# Patient Record
Sex: FEMALE | Race: WHITE | Employment: OTHER | URBAN - METROPOLITAN AREA
[De-identification: names, ages, dates, MRNs, and addresses within clinical notes are randomized per-mention and may not be internally consistent; named-entity substitution may affect disease eponyms.]

---

## 2017-04-14 ENCOUNTER — HOSPITAL ENCOUNTER (OUTPATIENT)
Age: 47
Discharge: HOME | End: 2017-04-14
Attending: FAMILY MEDICINE
Payer: COMMERCIAL

## 2017-04-14 VITALS
OXYGEN SATURATION: 100 % | HEART RATE: 71 BPM | SYSTOLIC BLOOD PRESSURE: 108 MMHG | RESPIRATION RATE: 18 BRPM | TEMPERATURE: 98 F | DIASTOLIC BLOOD PRESSURE: 68 MMHG

## 2017-04-14 DIAGNOSIS — J45.901 ASTHMA EXACERBATION: Primary | ICD-10-CM

## 2017-04-14 PROCEDURE — 99214 OFFICE O/P EST MOD 30 MIN: CPT

## 2017-04-14 PROCEDURE — 94640 AIRWAY INHALATION TREATMENT: CPT

## 2017-04-14 RX ORDER — BENZONATATE 200 MG/1
200 CAPSULE ORAL 3 TIMES DAILY PRN
Qty: 30 CAPSULE | Refills: 0 | Status: SHIPPED | OUTPATIENT
Start: 2017-04-14 | End: 2017-04-24

## 2017-04-14 RX ORDER — ALBUTEROL SULFATE 90 UG/1
AEROSOL, METERED RESPIRATORY (INHALATION) EVERY 6 HOURS PRN
COMMUNITY
End: 2018-03-06

## 2017-04-14 RX ORDER — PREDNISONE 20 MG/1
TABLET ORAL
Qty: 8 TABLET | Refills: 0 | Status: SHIPPED | OUTPATIENT
Start: 2017-04-14 | End: 2022-01-14

## 2017-04-14 RX ORDER — IPRATROPIUM BROMIDE AND ALBUTEROL SULFATE 2.5; .5 MG/3ML; MG/3ML
3 SOLUTION RESPIRATORY (INHALATION) ONCE
Status: COMPLETED | OUTPATIENT
Start: 2017-04-14 | End: 2017-04-14

## 2017-04-14 RX ORDER — ALBUTEROL SULFATE 2.5 MG/3ML
2.5 SOLUTION RESPIRATORY (INHALATION) EVERY 4 HOURS PRN
Qty: 1 BOX | Refills: 0 | Status: SHIPPED | OUTPATIENT
Start: 2017-04-14 | End: 2018-03-06

## 2017-04-14 RX ORDER — PREDNISONE 20 MG/1
40 TABLET ORAL ONCE
Status: COMPLETED | OUTPATIENT
Start: 2017-04-14 | End: 2017-04-14

## 2017-04-14 NOTE — ED INITIAL ASSESSMENT (HPI)
Complains of chest congestion for the last three days. Hx Asthma and has been using inhaler more. Sneezing a lot.

## 2017-04-14 NOTE — ED PROVIDER NOTES
Patient Seen in: [facility]    History   Patient presents with:  Chest Congestion    Stated Complaint: bronchitis x 3 days    HPI    This 52-year-old female presents to the office with 3 day history of worsening cough, shortness oz/week       0 Standard drinks or equivalent per week      Review of Systems    Positive for stated complaint: bronchitis x 3 days  Other systems are as noted in HPI. Constitutional and vital signs reviewed.       All other systems reviewed and negative e prescriptions for 4 additional days of prednisone 40 mg once daily, albuterol nebulizer solution, Tessalon Perles. Usage and side effects of these medications are discussed.   She is instructed to go to the emergency room if she has increased difficulty br on the prednisone, you may only take Tylenol for fever for pain. Do not take any ibuprofen, Advil, Motrin, Aleve or aspirin. You may take the Tessalon Perles 200 mg 1 Perle every 6 hours as needed for cough. Push fluids. Humidified air.   Go to the [facility]

## 2017-04-21 ENCOUNTER — HOSPITAL ENCOUNTER (OUTPATIENT)
Age: 47
Discharge: HOME | End: 2017-04-21
Attending: FAMILY MEDICINE
Payer: COMMERCIAL

## 2017-04-21 ENCOUNTER — APPOINTMENT (OUTPATIENT)
Dept: GENERAL RADIOLOGY | Age: 47
End: 2017-04-21
Attending: FAMILY MEDICINE
Payer: COMMERCIAL

## 2017-04-21 VITALS
DIASTOLIC BLOOD PRESSURE: 73 MMHG | HEART RATE: 71 BPM | OXYGEN SATURATION: 98 % | WEIGHT: 125 LBS | TEMPERATURE: 99 F | SYSTOLIC BLOOD PRESSURE: 109 MMHG | RESPIRATION RATE: 16 BRPM | BODY MASS INDEX: 21 KG/M2

## 2017-04-21 DIAGNOSIS — J20.9 ACUTE BRONCHITIS, UNSPECIFIED ORGANISM: ICD-10-CM

## 2017-04-21 DIAGNOSIS — J45.901 ASTHMA EXACERBATION: Primary | ICD-10-CM

## 2017-04-21 PROCEDURE — 96374 THER/PROPH/DIAG INJ IV PUSH: CPT

## 2017-04-21 PROCEDURE — 94640 AIRWAY INHALATION TREATMENT: CPT

## 2017-04-21 PROCEDURE — 71020 XR CHEST PA + LAT CHEST (CPT=71020): CPT

## 2017-04-21 PROCEDURE — 99214 OFFICE O/P EST MOD 30 MIN: CPT

## 2017-04-21 RX ORDER — IPRATROPIUM BROMIDE AND ALBUTEROL SULFATE 2.5; .5 MG/3ML; MG/3ML
3 SOLUTION RESPIRATORY (INHALATION) ONCE
Status: COMPLETED | OUTPATIENT
Start: 2017-04-21 | End: 2017-04-21

## 2017-04-21 RX ORDER — METHYLPREDNISOLONE SODIUM SUCCINATE 125 MG/2ML
125 INJECTION, POWDER, LYOPHILIZED, FOR SOLUTION INTRAMUSCULAR; INTRAVENOUS ONCE
Status: COMPLETED | OUTPATIENT
Start: 2017-04-21 | End: 2017-04-21

## 2017-04-21 RX ORDER — PREDNISONE 10 MG/1
TABLET ORAL
Qty: 20 TABLET | Refills: 0 | Status: SHIPPED | OUTPATIENT
Start: 2017-04-21 | End: 2022-01-14

## 2017-04-21 RX ORDER — AZITHROMYCIN 250 MG/1
TABLET, FILM COATED ORAL
Qty: 1 PACKAGE | Refills: 0 | Status: SHIPPED | OUTPATIENT
Start: 2017-04-21 | End: 2018-03-06

## 2017-04-21 NOTE — ED INITIAL ASSESSMENT (HPI)
Patient states she was in a few days ago and thought she had bronchitis. Patient states she was told that she did not. Patient states she has gotten worse and feels as if she needs and antibiotic.

## 2017-04-22 NOTE — ED NOTES
Patient called, sts that pharmacy did not receive scripts yesterday. Prednisone and Z-Pack called in, message left on VM.

## 2018-03-06 ENCOUNTER — HOSPITAL ENCOUNTER (OUTPATIENT)
Age: 48
Discharge: HOME | End: 2018-03-06
Attending: FAMILY MEDICINE
Payer: COMMERCIAL

## 2018-03-06 VITALS
DIASTOLIC BLOOD PRESSURE: 70 MMHG | OXYGEN SATURATION: 100 % | HEART RATE: 63 BPM | TEMPERATURE: 98 F | SYSTOLIC BLOOD PRESSURE: 109 MMHG | RESPIRATION RATE: 18 BRPM

## 2018-03-06 DIAGNOSIS — J40 BRONCHITIS: Primary | ICD-10-CM

## 2018-03-06 PROCEDURE — 99213 OFFICE O/P EST LOW 20 MIN: CPT

## 2018-03-06 PROCEDURE — 99214 OFFICE O/P EST MOD 30 MIN: CPT

## 2018-03-06 RX ORDER — AZITHROMYCIN 250 MG/1
TABLET, FILM COATED ORAL
Qty: 1 PACKAGE | Refills: 0 | Status: SHIPPED | OUTPATIENT
Start: 2018-03-06 | End: 2018-03-11

## 2018-03-06 RX ORDER — ALBUTEROL SULFATE 2.5 MG/3ML
2.5 SOLUTION RESPIRATORY (INHALATION) EVERY 4 HOURS PRN
Qty: 30 AMPULE | Refills: 0 | Status: SHIPPED | OUTPATIENT
Start: 2018-03-06 | End: 2018-04-05

## 2018-03-07 NOTE — ED PROVIDER NOTES
Patient Seen in: [facility] In [facility]    History   Patient presents with:  Cough    Stated Complaint: Wheezing,tight chest 10 days    HPI    53year old female presents for cough, wheezing and chest tightness.  States started with cold sym are normal. Pupils are equal, round, and reactive to light. Neck: Normal range of motion. Neck supple. Cardiovascular: Normal rate, regular rhythm, normal heart sounds and intact distal pulses.     Pulmonary/Chest: Effort normal. She has no decreased br

## 2021-05-12 ENCOUNTER — HOSPITAL ENCOUNTER (OUTPATIENT)
Dept: GENERAL RADIOLOGY | Age: 51
Discharge: HOME | End: 2021-05-12
Attending: ORTHOPAEDIC SURGERY
Payer: COMMERCIAL

## 2021-05-12 ENCOUNTER — OFFICE VISIT (OUTPATIENT)
Dept: ORTHOPEDICS CLINIC | Facility: CLINIC | Age: 51
End: 2021-05-12
Payer: COMMERCIAL

## 2021-05-12 VITALS — WEIGHT: 120 LBS | HEIGHT: 64 IN | BODY MASS INDEX: 20.49 KG/M2

## 2021-05-12 DIAGNOSIS — M25.561 RIGHT KNEE PAIN, UNSPECIFIED CHRONICITY: ICD-10-CM

## 2021-05-12 DIAGNOSIS — S83.241A TEAR OF MEDIAL MENISCUS OF RIGHT KNEE, UNSPECIFIED TEAR TYPE, UNSPECIFIED WHETHER OLD OR CURRENT TEAR, INITIAL ENCOUNTER: Primary | ICD-10-CM

## 2021-05-12 DIAGNOSIS — M25.561 ACUTE PAIN OF RIGHT KNEE: ICD-10-CM

## 2021-05-12 PROCEDURE — 99203 OFFICE O/P NEW LOW 30 MIN: CPT | Performed by: ORTHOPAEDIC SURGERY

## 2021-05-12 PROCEDURE — 3008F BODY MASS INDEX DOCD: CPT | Performed by: ORTHOPAEDIC SURGERY

## 2021-05-12 PROCEDURE — 73564 X-RAY EXAM KNEE 4 OR MORE: CPT | Performed by: ORTHOPAEDIC SURGERY

## 2021-05-12 NOTE — PROGRESS NOTES
[facility] New Patient Note    CC: Patient presents with:  Knee Pain: Patient is here for right knee pain. Patient states that she was running about a week ago when she started to feel pain.       HPI: The patient is a 49year old female who pre control/protection: Condom       ROS:  Complete ROS reviewed by me and non-contributory to the chief complaint except as mentioned above.     Physical Exam:    Ht 5' 4\" (1.626 m)   Wt 120 lb (54.4 kg)   BMI 20.60 kg/m²   On examination she is a 72-year-old Crutch or cane would be reasonable. We will see her back once the MRI becomes available. Regis Scott MD  [address] [place] [address] Surgery      This document was partially prepared using [id number] voice recognition software.

## 2021-07-09 NOTE — ED PROVIDER NOTES
Patient Seen in: [facility] In [place]    History   Patient presents with:  Cough/URI    Stated Complaint: BRONCHITIS X2 WKS    HPI    This 69-year-old female with a known history for asthma presents the office with complaint of worsening Breath. Nebulizer Does not apply Misc,  DX. Asthma exacerbation J45.901   Budesonide-Formoterol Fumarate (SYMBICORT IN),  Inhale  into the lungs.    Albuterol Sulfate (VENTOLIN) (2.5 MG/3ML) 0.083% Inhalation Nebu Soln,  Take 2.5 mg by nebulization every inspiratory and expiratory wheezing noted. Some rhonchi bibasilar. . No retractions or tachypnea noted. EXTREMITIES: No clubbing, cyanosis, edema noted. SKIN: Warm and dry.       ED Course   Labs Reviewed - No data to display    Xr Chest Pa + Lat Chest (c has increased difficulty breathing despite being on these medications. She is to follow-up with her primary doctor in 2-3 days if not improving.       Disposition and Plan     Clinical Impression:  Asthma exacerbation  (primary encounter diagnosis)  Acute Follow-up with your primary doctor in 2-3 days if not improving. yes

## 2022-01-05 ENCOUNTER — TELEPHONE (OUTPATIENT)
Dept: OBGYN CLINIC | Facility: CLINIC | Age: 52
End: 2022-01-05

## 2022-01-14 ENCOUNTER — OFFICE VISIT (OUTPATIENT)
Dept: OBGYN CLINIC | Facility: CLINIC | Age: 52
End: 2022-01-14
Payer: COMMERCIAL

## 2022-01-14 VITALS — WEIGHT: 127.63 LBS | BODY MASS INDEX: 21.79 KG/M2 | HEIGHT: 64 IN

## 2022-01-14 DIAGNOSIS — R35.0 URINARY FREQUENCY: ICD-10-CM

## 2022-01-14 DIAGNOSIS — R10.2 PELVIC CRAMPING: Primary | ICD-10-CM

## 2022-01-14 DIAGNOSIS — R10.2 PELVIC PRESSURE IN FEMALE: ICD-10-CM

## 2022-01-14 DIAGNOSIS — N89.8 VAGINAL IRRITATION: ICD-10-CM

## 2022-01-14 DIAGNOSIS — N95.1 VAGINAL DRYNESS, MENOPAUSAL: ICD-10-CM

## 2022-01-14 LAB
APPEARANCE: CLEAR
BILIRUBIN: NEGATIVE
GLUCOSE (URINE DIPSTICK): NEGATIVE MG/DL
KETONES (URINE DIPSTICK): NEGATIVE MG/DL
LEUKOCYTES: NEGATIVE
MULTISTIX LOT#: NORMAL NUMERIC
NITRITE, URINE: NEGATIVE
OCCULT BLOOD: NEGATIVE
PH, URINE: 7 (ref 4.5–8)
PROTEIN (URINE DIPSTICK): NEGATIVE MG/DL
SPECIFIC GRAVITY: 1.02 (ref 1–1.03)
URINE-COLOR: YELLOW
UROBILINOGEN,SEMI-QN: 0.2 MG/DL (ref 0–1.9)

## 2022-01-14 PROCEDURE — 87510 GARDNER VAG DNA DIR PROBE: CPT | Performed by: OBSTETRICS & GYNECOLOGY

## 2022-01-14 PROCEDURE — 81002 URINALYSIS NONAUTO W/O SCOPE: CPT | Performed by: OBSTETRICS & GYNECOLOGY

## 2022-01-14 PROCEDURE — 87660 TRICHOMONAS VAGIN DIR PROBE: CPT | Performed by: OBSTETRICS & GYNECOLOGY

## 2022-01-14 PROCEDURE — 87086 URINE CULTURE/COLONY COUNT: CPT | Performed by: OBSTETRICS & GYNECOLOGY

## 2022-01-14 PROCEDURE — 3008F BODY MASS INDEX DOCD: CPT | Performed by: OBSTETRICS & GYNECOLOGY

## 2022-01-14 PROCEDURE — 87480 CANDIDA DNA DIR PROBE: CPT | Performed by: OBSTETRICS & GYNECOLOGY

## 2022-01-14 PROCEDURE — 99203 OFFICE O/P NEW LOW 30 MIN: CPT | Performed by: OBSTETRICS & GYNECOLOGY

## 2022-01-14 RX ORDER — CONJUGATED ESTROGENS 0.62 MG/G
0.5 CREAM VAGINAL
Qty: 30 G | Refills: 0 | Status: SHIPPED | OUTPATIENT
Start: 2022-01-14

## 2022-01-14 RX ORDER — ALBUTEROL SULFATE 90 UG/1
AEROSOL, METERED RESPIRATORY (INHALATION)
COMMUNITY

## 2022-01-14 NOTE — PROGRESS NOTES
Rimma Figueroa is a 47year old female. HPI:   Patient presents with:  Gyn Problem: Vaginal Iritation,Lower abdominal pain/pressure and cramps x several months urinary frequency. Patient was treated for uti x  weeks ago.  Per patient she completed the cou the most recent one probably within the past 6 months at [facility]. Ultrasounds have been normal    5. Vaginal dryness, menopausal  Patient to start Premarin 0.5 g twice weekly. No contraindication to estrogen. No history of DVT PE.   No history of breast ca

## 2022-02-28 ENCOUNTER — OFFICE VISIT (OUTPATIENT)
Dept: OBGYN CLINIC | Facility: CLINIC | Age: 52
End: 2022-02-28
Payer: COMMERCIAL

## 2022-02-28 VITALS
HEART RATE: 60 BPM | SYSTOLIC BLOOD PRESSURE: 121 MMHG | HEIGHT: 64 IN | WEIGHT: 126 LBS | DIASTOLIC BLOOD PRESSURE: 77 MMHG | BODY MASS INDEX: 21.51 KG/M2

## 2022-02-28 DIAGNOSIS — N95.2 VAGINAL ATROPHY: Primary | ICD-10-CM

## 2022-02-28 DIAGNOSIS — N89.8 VAGINAL IRRITATION: ICD-10-CM

## 2022-02-28 PROBLEM — T38.5X5A: Status: ACTIVE | Noted: 2022-02-28

## 2022-02-28 PROCEDURE — 3008F BODY MASS INDEX DOCD: CPT | Performed by: OBSTETRICS & GYNECOLOGY

## 2022-02-28 PROCEDURE — 3078F DIAST BP <80 MM HG: CPT | Performed by: OBSTETRICS & GYNECOLOGY

## 2022-02-28 PROCEDURE — 3074F SYST BP LT 130 MM HG: CPT | Performed by: OBSTETRICS & GYNECOLOGY

## 2022-02-28 PROCEDURE — 99213 OFFICE O/P EST LOW 20 MIN: CPT | Performed by: OBSTETRICS & GYNECOLOGY

## 2022-04-14 ENCOUNTER — OFFICE VISIT (OUTPATIENT)
Dept: SURGERY | Facility: CLINIC | Age: 52
End: 2022-04-14
Payer: COMMERCIAL

## 2022-04-14 DIAGNOSIS — Z78.0 POST-MENOPAUSE: ICD-10-CM

## 2022-04-14 DIAGNOSIS — R39.89 SENSATION OF PRESSURE IN BLADDER AREA: Primary | ICD-10-CM

## 2022-04-14 DIAGNOSIS — R35.0 URINARY FREQUENCY: ICD-10-CM

## 2022-04-14 LAB
APPEARANCE: CLEAR
BILIRUBIN: NEGATIVE
GLUCOSE (URINE DIPSTICK): NEGATIVE MG/DL
KETONES (URINE DIPSTICK): NEGATIVE MG/DL
LEUKOCYTES: NEGATIVE
MULTISTIX LOT#: NORMAL NUMERIC
NITRITE, URINE: NEGATIVE
OCCULT BLOOD: NEGATIVE
PH, URINE: 7.5 (ref 4.5–8)
PROTEIN (URINE DIPSTICK): NEGATIVE MG/DL
SPECIFIC GRAVITY: 1.02 (ref 1–1.03)
URINE-COLOR: YELLOW
UROBILINOGEN,SEMI-QN: 0.2 MG/DL (ref 0–1.9)

## 2022-04-14 PROCEDURE — 81003 URINALYSIS AUTO W/O SCOPE: CPT | Performed by: PHYSICIAN ASSISTANT

## 2022-04-14 PROCEDURE — 99203 OFFICE O/P NEW LOW 30 MIN: CPT | Performed by: PHYSICIAN ASSISTANT

## 2022-04-14 PROCEDURE — 51798 US URINE CAPACITY MEASURE: CPT | Performed by: PHYSICIAN ASSISTANT

## 2022-04-15 ENCOUNTER — HOSPITAL ENCOUNTER (OUTPATIENT)
Dept: ULTRASOUND IMAGING | Age: 52
Discharge: HOME | End: 2022-04-15
Attending: PHYSICIAN ASSISTANT
Payer: COMMERCIAL

## 2022-04-15 DIAGNOSIS — R35.0 URINARY FREQUENCY: ICD-10-CM

## 2022-04-15 DIAGNOSIS — R39.89 SENSATION OF PRESSURE IN BLADDER AREA: ICD-10-CM

## 2022-04-15 PROCEDURE — 76770 US EXAM ABDO BACK WALL COMP: CPT | Performed by: PHYSICIAN ASSISTANT

## 2022-04-16 ENCOUNTER — PATIENT MESSAGE (OUTPATIENT)
Dept: SURGERY | Facility: CLINIC | Age: 52
End: 2022-04-16

## 2022-04-18 RX ORDER — ESTRADIOL 4 UG/1
INSERT VAGINAL
Qty: 18 EACH | Refills: 0 | Status: SHIPPED | OUTPATIENT
Start: 2022-04-18 | End: 2022-05-16

## 2022-04-18 NOTE — TELEPHONE ENCOUNTER
From: Myron Scott  To:  Delaney Sanches PA-C  Sent: 4/16/2022 1:06 PM CDT  Subject: Question regarding Ultrasound Scan Urinary Tract    thank you so much!  i will-  kymberly wade

## 2022-04-18 NOTE — TELEPHONE ENCOUNTER
Patient was given samples in the office for Imvexy, its working well, she would like a prescription called in

## 2022-05-03 RX ORDER — ESTRADIOL 4 UG/1
INSERT VAGINAL
Qty: 18 EACH | Refills: 0 | Status: SHIPPED | OUTPATIENT
Start: 2022-05-03 | End: 2022-05-31

## 2022-09-09 ENCOUNTER — OFFICE VISIT (OUTPATIENT)
Dept: OBGYN CLINIC | Facility: CLINIC | Age: 52
End: 2022-09-09
Payer: COMMERCIAL

## 2022-09-09 VITALS
WEIGHT: 131 LBS | BODY MASS INDEX: 22.36 KG/M2 | SYSTOLIC BLOOD PRESSURE: 110 MMHG | HEIGHT: 64 IN | HEART RATE: 57 BPM | DIASTOLIC BLOOD PRESSURE: 77 MMHG

## 2022-09-09 DIAGNOSIS — R10.2 PELVIC CRAMPING: ICD-10-CM

## 2022-09-09 DIAGNOSIS — N89.8 VAGINAL IRRITATION: Primary | ICD-10-CM

## 2022-09-09 DIAGNOSIS — R39.9 UTI SYMPTOMS: ICD-10-CM

## 2022-09-09 LAB
APPEARANCE: CLEAR
BILIRUBIN: NEGATIVE
GLUCOSE (URINE DIPSTICK): NEGATIVE MG/DL
KETONES (URINE DIPSTICK): NEGATIVE MG/DL
LEUKOCYTES: NEGATIVE
MULTISTIX LOT#: NORMAL NUMERIC
NITRITE, URINE: NEGATIVE
OCCULT BLOOD: NEGATIVE
PH, URINE: 7 (ref 4.5–8)
PROTEIN (URINE DIPSTICK): NEGATIVE MG/DL
SPECIFIC GRAVITY: 1.01 (ref 1–1.03)
URINE-COLOR: YELLOW
UROBILINOGEN,SEMI-QN: 0.2 MG/DL (ref 0–1.9)

## 2022-09-09 PROCEDURE — 3074F SYST BP LT 130 MM HG: CPT | Performed by: OBSTETRICS & GYNECOLOGY

## 2022-09-09 PROCEDURE — 3008F BODY MASS INDEX DOCD: CPT | Performed by: OBSTETRICS & GYNECOLOGY

## 2022-09-09 PROCEDURE — 81002 URINALYSIS NONAUTO W/O SCOPE: CPT | Performed by: OBSTETRICS & GYNECOLOGY

## 2022-09-09 PROCEDURE — 99214 OFFICE O/P EST MOD 30 MIN: CPT | Performed by: OBSTETRICS & GYNECOLOGY

## 2022-09-09 PROCEDURE — 3078F DIAST BP <80 MM HG: CPT | Performed by: OBSTETRICS & GYNECOLOGY

## 2022-09-09 RX ORDER — FLUTICASONE FUROATE AND VILANTEROL 100; 25 UG/1; UG/1
POWDER RESPIRATORY (INHALATION)
COMMUNITY
Start: 2017-02-21

## 2022-09-19 ENCOUNTER — TELEPHONE (OUTPATIENT)
Dept: INTERNAL MEDICINE CLINIC | Facility: CLINIC | Age: 52
End: 2022-09-19

## 2022-09-19 ENCOUNTER — OFFICE VISIT (OUTPATIENT)
Dept: INTERNAL MEDICINE CLINIC | Facility: CLINIC | Age: 52
End: 2022-09-19
Payer: COMMERCIAL

## 2022-09-19 VITALS
HEIGHT: 64 IN | SYSTOLIC BLOOD PRESSURE: 110 MMHG | RESPIRATION RATE: 12 BRPM | OXYGEN SATURATION: 100 % | BODY MASS INDEX: 22.14 KG/M2 | DIASTOLIC BLOOD PRESSURE: 62 MMHG | TEMPERATURE: 97 F | WEIGHT: 129.69 LBS | HEART RATE: 58 BPM

## 2022-09-19 DIAGNOSIS — Z13.29 SCREENING FOR THYROID DISORDER: ICD-10-CM

## 2022-09-19 DIAGNOSIS — Z13.220 SCREENING, LIPID: ICD-10-CM

## 2022-09-19 DIAGNOSIS — Z12.11 ENCOUNTER FOR SCREENING FOR MALIGNANT NEOPLASM OF COLON: ICD-10-CM

## 2022-09-19 DIAGNOSIS — R59.9 REACTIVE LYMPHADENOPATHY: ICD-10-CM

## 2022-09-19 DIAGNOSIS — Z12.31 ENCOUNTER FOR SCREENING MAMMOGRAM FOR MALIGNANT NEOPLASM OF BREAST: ICD-10-CM

## 2022-09-19 DIAGNOSIS — Z00.00 ROUTINE GENERAL MEDICAL EXAMINATION AT A HEALTH CARE FACILITY: Primary | ICD-10-CM

## 2022-09-19 DIAGNOSIS — Z13.0 SCREENING FOR BLOOD DISEASE: ICD-10-CM

## 2022-09-19 DIAGNOSIS — Z13.1 SCREENING FOR DIABETES MELLITUS: ICD-10-CM

## 2022-09-19 PROBLEM — R35.0 URINARY FREQUENCY: Status: RESOLVED | Noted: 2022-01-14 | Resolved: 2022-09-19

## 2022-09-19 PROBLEM — Z86.19 HISTORY OF CLOSTRIDIUM DIFFICILE INFECTION: Status: ACTIVE | Noted: 2022-09-19

## 2022-09-19 PROCEDURE — 3078F DIAST BP <80 MM HG: CPT | Performed by: NURSE PRACTITIONER

## 2022-09-19 PROCEDURE — 3074F SYST BP LT 130 MM HG: CPT | Performed by: NURSE PRACTITIONER

## 2022-09-19 PROCEDURE — 99386 PREV VISIT NEW AGE 40-64: CPT | Performed by: NURSE PRACTITIONER

## 2022-09-19 PROCEDURE — 3008F BODY MASS INDEX DOCD: CPT | Performed by: NURSE PRACTITIONER

## 2022-09-19 NOTE — TELEPHONE ENCOUNTER
Cologuard form faxed to [employer] at [phone]. Confirmation was received and order requisition form sent to scanning.

## 2022-09-19 NOTE — PROGRESS NOTES
CPE - Overdue - Needs To Schedule Appointment  COLONOSCOPY - Due Referral Pended  MAMMOGRAm - Due Order Pended  PAP - Due

## 2022-09-19 NOTE — TELEPHONE ENCOUNTER
Records Requested from:    [facility] Pap- Dr. Keith De  [phone]    Confirmation was received.

## 2022-09-30 ENCOUNTER — HOSPITAL ENCOUNTER (OUTPATIENT)
Dept: MAMMOGRAPHY | Age: 52
Discharge: HOME | End: 2022-09-30
Attending: NURSE PRACTITIONER
Payer: COMMERCIAL

## 2022-09-30 DIAGNOSIS — Z12.31 ENCOUNTER FOR SCREENING MAMMOGRAM FOR MALIGNANT NEOPLASM OF BREAST: ICD-10-CM

## 2022-09-30 PROCEDURE — 77067 SCR MAMMO BI INCL CAD: CPT | Performed by: NURSE PRACTITIONER

## 2022-09-30 PROCEDURE — 77063 BREAST TOMOSYNTHESIS BI: CPT | Performed by: NURSE PRACTITIONER

## 2022-10-10 ENCOUNTER — LAB ENCOUNTER (OUTPATIENT)
Dept: LAB | Age: 52
End: 2022-10-10
Attending: INTERNAL MEDICINE
Payer: COMMERCIAL

## 2022-10-10 DIAGNOSIS — Z13.1 SCREENING FOR DIABETES MELLITUS: ICD-10-CM

## 2022-10-10 DIAGNOSIS — Z13.220 SCREENING, LIPID: ICD-10-CM

## 2022-10-10 DIAGNOSIS — Z13.0 SCREENING FOR BLOOD DISEASE: ICD-10-CM

## 2022-10-10 DIAGNOSIS — Z13.29 SCREENING FOR THYROID DISORDER: ICD-10-CM

## 2022-10-10 LAB
ALBUMIN SERPL-MCNC: 3.9 G/DL (ref 3.4–5)
ALBUMIN/GLOB SERPL: 1.3 {RATIO} (ref 1–2)
ALP LIVER SERPL-CCNC: 65 U/L
ALT SERPL-CCNC: 22 U/L
ANION GAP SERPL CALC-SCNC: 3 MMOL/L (ref 0–18)
AST SERPL-CCNC: 21 U/L (ref 15–37)
BASOPHILS # BLD AUTO: 0.05 X10(3) UL (ref 0–0.2)
BASOPHILS NFR BLD AUTO: 1 %
BILIRUB SERPL-MCNC: 1.2 MG/DL (ref 0.1–2)
BUN BLD-MCNC: 14 MG/DL (ref 7–18)
CALCIUM BLD-MCNC: 9.3 MG/DL (ref 8.5–10.1)
CHLORIDE SERPL-SCNC: 106 MMOL/L (ref 98–112)
CHOLEST SERPL-MCNC: 182 MG/DL (ref ?–200)
CO2 SERPL-SCNC: 29 MMOL/L (ref 21–32)
CREAT BLD-MCNC: 0.67 MG/DL
EOSINOPHIL # BLD AUTO: 0.16 X10(3) UL (ref 0–0.7)
EOSINOPHIL NFR BLD AUTO: 3.2 %
ERYTHROCYTE [DISTWIDTH] IN BLOOD BY AUTOMATED COUNT: 12.8 %
FASTING PATIENT LIPID ANSWER: YES
FASTING STATUS PATIENT QL REPORTED: YES
GFR SERPLBLD BASED ON 1.73 SQ M-ARVRAT: 106 ML/MIN/1.73M2 (ref 60–?)
GLOBULIN PLAS-MCNC: 3 G/DL (ref 2.8–4.4)
GLUCOSE BLD-MCNC: 77 MG/DL (ref 70–99)
HCT VFR BLD AUTO: 41 %
HDLC SERPL-MCNC: 66 MG/DL (ref 40–59)
HGB BLD-MCNC: 13.6 G/DL
IMM GRANULOCYTES # BLD AUTO: 0.01 X10(3) UL (ref 0–1)
IMM GRANULOCYTES NFR BLD: 0.2 %
LDLC SERPL CALC-MCNC: 105 MG/DL (ref ?–100)
LYMPHOCYTES # BLD AUTO: 1.32 X10(3) UL (ref 1–4)
LYMPHOCYTES NFR BLD AUTO: 26 %
MCH RBC QN AUTO: 31.2 PG (ref 26–34)
MCHC RBC AUTO-ENTMCNC: 33.2 G/DL (ref 31–37)
MCV RBC AUTO: 94 FL
MONOCYTES # BLD AUTO: 0.43 X10(3) UL (ref 0.1–1)
MONOCYTES NFR BLD AUTO: 8.5 %
NEUTROPHILS # BLD AUTO: 3.1 X10 (3) UL (ref 1.5–7.7)
NEUTROPHILS # BLD AUTO: 3.1 X10(3) UL (ref 1.5–7.7)
NEUTROPHILS NFR BLD AUTO: 61.1 %
NONHDLC SERPL-MCNC: 116 MG/DL (ref ?–130)
OSMOLALITY SERPL CALC.SUM OF ELEC: 285 MOSM/KG (ref 275–295)
PLATELET # BLD AUTO: 190 10(3)UL (ref 150–450)
POTASSIUM SERPL-SCNC: 4.5 MMOL/L (ref 3.5–5.1)
PROT SERPL-MCNC: 6.9 G/DL (ref 6.4–8.2)
RBC # BLD AUTO: 4.36 X10(6)UL
SODIUM SERPL-SCNC: 138 MMOL/L (ref 136–145)
TRIGL SERPL-MCNC: 58 MG/DL (ref 30–149)
TSI SER-ACNC: 2.04 MIU/ML (ref 0.36–3.74)
VLDLC SERPL CALC-MCNC: 10 MG/DL (ref 0–30)
WBC # BLD AUTO: 5.1 X10(3) UL (ref 4–11)

## 2022-10-10 PROCEDURE — 36415 COLL VENOUS BLD VENIPUNCTURE: CPT

## 2022-10-10 PROCEDURE — 80053 COMPREHEN METABOLIC PANEL: CPT

## 2022-10-10 PROCEDURE — 84443 ASSAY THYROID STIM HORMONE: CPT

## 2022-10-10 PROCEDURE — 80061 LIPID PANEL: CPT

## 2022-10-10 PROCEDURE — 85025 COMPLETE CBC W/AUTO DIFF WBC: CPT

## 2022-12-05 ENCOUNTER — TELEPHONE (OUTPATIENT)
Dept: INTERNAL MEDICINE CLINIC | Facility: CLINIC | Age: 52
End: 2022-12-05

## 2022-12-05 NOTE — TELEPHONE ENCOUNTER
Letter received from [facility] advising that patient has not completed Cologuard. Message sent to patient via my chart message to remind patient to get Cologuard completed.

## 2023-05-09 ENCOUNTER — TELEPHONE (OUTPATIENT)
Dept: ORTHOPEDICS CLINIC | Facility: CLINIC | Age: 53
End: 2023-05-09

## 2023-05-09 ENCOUNTER — HOSPITAL ENCOUNTER (OUTPATIENT)
Dept: GENERAL RADIOLOGY | Age: 53
Discharge: HOME | End: 2023-05-09
Attending: PHYSICIAN ASSISTANT
Payer: COMMERCIAL

## 2023-05-09 DIAGNOSIS — Z01.89 ENCOUNTER FOR LOWER EXTREMITY COMPARISON IMAGING STUDY: ICD-10-CM

## 2023-05-09 DIAGNOSIS — M25.561 RIGHT KNEE PAIN, UNSPECIFIED CHRONICITY: ICD-10-CM

## 2023-05-09 DIAGNOSIS — M25.561 RIGHT KNEE PAIN, UNSPECIFIED CHRONICITY: Primary | ICD-10-CM

## 2023-05-09 PROCEDURE — 73564 X-RAY EXAM KNEE 4 OR MORE: CPT | Performed by: PHYSICIAN ASSISTANT

## 2023-05-09 PROCEDURE — 73562 X-RAY EXAM OF KNEE 3: CPT | Performed by: PHYSICIAN ASSISTANT

## 2023-05-10 ENCOUNTER — OFFICE VISIT (OUTPATIENT)
Dept: ORTHOPEDICS CLINIC | Facility: CLINIC | Age: 53
End: 2023-05-10
Payer: COMMERCIAL

## 2023-05-10 VITALS — HEIGHT: 64 IN | WEIGHT: 124 LBS | BODY MASS INDEX: 21.17 KG/M2

## 2023-05-10 DIAGNOSIS — S83.206A POSITIVE MCMURRAY TEST OF RIGHT KNEE, INITIAL ENCOUNTER: Primary | ICD-10-CM

## 2023-05-10 PROCEDURE — 99204 OFFICE O/P NEW MOD 45 MIN: CPT | Performed by: PHYSICIAN ASSISTANT

## 2023-05-10 PROCEDURE — 3008F BODY MASS INDEX DOCD: CPT | Performed by: PHYSICIAN ASSISTANT

## 2023-05-23 ENCOUNTER — OFFICE VISIT (OUTPATIENT)
Dept: OBGYN CLINIC | Facility: CLINIC | Age: 53
End: 2023-05-23

## 2023-05-23 VITALS
DIASTOLIC BLOOD PRESSURE: 65 MMHG | BODY MASS INDEX: 22 KG/M2 | SYSTOLIC BLOOD PRESSURE: 105 MMHG | WEIGHT: 125.69 LBS | HEART RATE: 75 BPM

## 2023-05-23 DIAGNOSIS — N95.2 POSTMENOPAUSAL ATROPHIC VAGINITIS: Primary | ICD-10-CM

## 2023-05-23 PROCEDURE — 99203 OFFICE O/P NEW LOW 30 MIN: CPT | Performed by: OBSTETRICS & GYNECOLOGY

## 2023-05-23 PROCEDURE — 3078F DIAST BP <80 MM HG: CPT | Performed by: OBSTETRICS & GYNECOLOGY

## 2023-05-23 PROCEDURE — 3074F SYST BP LT 130 MM HG: CPT | Performed by: OBSTETRICS & GYNECOLOGY

## 2023-05-23 RX ORDER — FLUTICASONE FUROATE AND VILANTEROL 100; 25 UG/1; UG/1
POWDER RESPIRATORY (INHALATION)
COMMUNITY
Start: 2016-05-09

## 2023-05-25 ENCOUNTER — HOSPITAL ENCOUNTER (OUTPATIENT)
Dept: MRI IMAGING | Age: 53
Discharge: HOME | End: 2023-05-25
Attending: PHYSICIAN ASSISTANT
Payer: COMMERCIAL

## 2023-05-25 DIAGNOSIS — S83.206A POSITIVE MCMURRAY TEST OF RIGHT KNEE, INITIAL ENCOUNTER: ICD-10-CM

## 2023-05-25 PROCEDURE — 73721 MRI JNT OF LWR EXTRE W/O DYE: CPT | Performed by: PHYSICIAN ASSISTANT

## 2023-05-26 ENCOUNTER — OFFICE VISIT (OUTPATIENT)
Dept: ORTHOPEDICS CLINIC | Facility: CLINIC | Age: 53
End: 2023-05-26
Payer: COMMERCIAL

## 2023-05-26 VITALS — HEIGHT: 64 IN | WEIGHT: 125 LBS | BODY MASS INDEX: 21.34 KG/M2

## 2023-05-26 DIAGNOSIS — M17.11 PRIMARY OSTEOARTHRITIS OF RIGHT KNEE: ICD-10-CM

## 2023-05-26 DIAGNOSIS — M22.2X1 PATELLOFEMORAL PAIN SYNDROME OF RIGHT KNEE: Primary | ICD-10-CM

## 2023-05-26 PROCEDURE — 99213 OFFICE O/P EST LOW 20 MIN: CPT | Performed by: PHYSICIAN ASSISTANT

## 2023-05-26 PROCEDURE — 3008F BODY MASS INDEX DOCD: CPT | Performed by: PHYSICIAN ASSISTANT

## 2023-07-19 ENCOUNTER — OFFICE VISIT (OUTPATIENT)
Dept: INTERNAL MEDICINE CLINIC | Facility: CLINIC | Age: 53
End: 2023-07-19
Payer: COMMERCIAL

## 2023-07-19 VITALS
SYSTOLIC BLOOD PRESSURE: 117 MMHG | DIASTOLIC BLOOD PRESSURE: 72 MMHG | WEIGHT: 126.38 LBS | BODY MASS INDEX: 21.05 KG/M2 | HEIGHT: 65 IN | HEART RATE: 66 BPM | OXYGEN SATURATION: 96 %

## 2023-07-19 DIAGNOSIS — Z13.1 SCREENING FOR DIABETES MELLITUS: ICD-10-CM

## 2023-07-19 DIAGNOSIS — Z12.31 ENCOUNTER FOR SCREENING MAMMOGRAM FOR MALIGNANT NEOPLASM OF BREAST: ICD-10-CM

## 2023-07-19 DIAGNOSIS — Z13.0 SCREENING FOR BLOOD DISEASE: ICD-10-CM

## 2023-07-19 DIAGNOSIS — J45.20 MILD INTERMITTENT ASTHMA WITHOUT COMPLICATION: Primary | ICD-10-CM

## 2023-07-19 DIAGNOSIS — Z13.29 SCREENING FOR THYROID DISORDER: ICD-10-CM

## 2023-07-19 DIAGNOSIS — Z13.220 SCREENING, LIPID: ICD-10-CM

## 2023-07-19 PROBLEM — N89.8 VAGINAL IRRITATION: Status: RESOLVED | Noted: 2022-01-14 | Resolved: 2023-07-19

## 2023-07-19 PROBLEM — R10.2 PELVIC CRAMPING: Status: RESOLVED | Noted: 2022-01-14 | Resolved: 2023-07-19

## 2023-07-19 PROCEDURE — 3008F BODY MASS INDEX DOCD: CPT | Performed by: NURSE PRACTITIONER

## 2023-07-19 PROCEDURE — 99213 OFFICE O/P EST LOW 20 MIN: CPT | Performed by: NURSE PRACTITIONER

## 2023-07-19 PROCEDURE — 3078F DIAST BP <80 MM HG: CPT | Performed by: NURSE PRACTITIONER

## 2023-07-19 PROCEDURE — 3074F SYST BP LT 130 MM HG: CPT | Performed by: NURSE PRACTITIONER

## 2023-07-19 RX ORDER — FLUTICASONE FUROATE AND VILANTEROL 100; 25 UG/1; UG/1
1 POWDER RESPIRATORY (INHALATION) DAILY
Qty: 3 EACH | Refills: 0 | Status: SHIPPED | OUTPATIENT
Start: 2023-07-19

## 2023-07-19 RX ORDER — ALBUTEROL SULFATE 90 UG/1
1 AEROSOL, METERED RESPIRATORY (INHALATION) EVERY 4 HOURS PRN
Qty: 3 EACH | Refills: 0 | Status: SHIPPED | OUTPATIENT
Start: 2023-07-19

## 2023-07-20 ENCOUNTER — OFFICE VISIT (OUTPATIENT)
Dept: OBGYN CLINIC | Facility: CLINIC | Age: 53
End: 2023-07-20

## 2023-07-20 VITALS
BODY MASS INDEX: 21.03 KG/M2 | DIASTOLIC BLOOD PRESSURE: 62 MMHG | HEIGHT: 64.5 IN | SYSTOLIC BLOOD PRESSURE: 100 MMHG | WEIGHT: 124.69 LBS

## 2023-07-20 DIAGNOSIS — Z01.419 WOMEN'S ANNUAL ROUTINE GYNECOLOGICAL EXAMINATION: ICD-10-CM

## 2023-07-20 DIAGNOSIS — N95.2 POSTMENOPAUSAL ATROPHIC VAGINITIS: Primary | ICD-10-CM

## 2023-07-20 PROCEDURE — 3078F DIAST BP <80 MM HG: CPT | Performed by: OBSTETRICS & GYNECOLOGY

## 2023-07-20 PROCEDURE — 3074F SYST BP LT 130 MM HG: CPT | Performed by: OBSTETRICS & GYNECOLOGY

## 2023-07-20 PROCEDURE — 3008F BODY MASS INDEX DOCD: CPT | Performed by: OBSTETRICS & GYNECOLOGY

## 2023-07-20 PROCEDURE — 99396 PREV VISIT EST AGE 40-64: CPT | Performed by: OBSTETRICS & GYNECOLOGY

## 2023-07-20 RX ORDER — ESTRADIOL 0.1 MG/G
1 CREAM VAGINAL DAILY
Qty: 42.5 G | Refills: 3 | Status: SHIPPED | OUTPATIENT
Start: 2023-07-20

## 2023-07-20 NOTE — PROGRESS NOTES
[facility]  Obstetrics and Gynecology  Gynecology Visit    Patient presents with: Annual          Joslyn Mac is a 47year old female who presents for Annual exam.    LMP: pm.    Menses regular: n/a. Menstrual flow normal: n/a. Birth control: no.   Refill no  Last Pap Smear: Unsure  Any history of abnormal paps: Yes   Last MM2022  Sleep: 7-8hrs. Diet: Balanced. Exercise: daily. Screening labs/Blood work today: no.     Colonoscopy (if over 38 y/o): Has cologard kit. Gardasil:(age 10-37 y/o) n/a. Genetic Cancer screen (if indicated): No.   Flu (Aug-April): no. TDAP (every 10 years) , Declined today. Additional Problems/concerns: Pt reports she has been having vaginal pain, burning, frequent urination, burning with urination. Next Appt: Will call to schedule. Immunization History   Administered Date(s) Administered    DT 10/08/2003    TDAP 2007         Current Outpatient Medications:     albuterol 108 (90 Base) MCG/ACT Inhalation Aero Soln, Inhale 1 puff into the lungs every 4 (four) hours as needed for Wheezing or Shortness of Breath., Disp: 3 each, Rfl: 0    fluticasone furoate-vilanterol (BREO ELLIPTA) 100-25 MCG/ACT Inhalation Aerosol Powder, Breath Activated, Inhale 1 puff into the lungs daily. , Disp: 3 each, Rfl: 0    fluticasone furoate-vilanterol 100-25 MCG/INH Inhalation Aerosol Powder, Breath Activated, , Disp: , Rfl:       Ragweed                     OB History     T2    L2    SAB0  IAB0  Ectopic0  Multiple0  Live Births2     Name of Baby 1: Not recorded    Date: Not recorded     GA: Not recorded     Delivery: Not recorded    Ardena Arrow: Not recorded     Ashia Orozco: Not recorded    Living: Not recorded    Name of Baby 2: Not recorded    Date: 26             GA: 36w0d            Delivery: Vaginal, Spontaneous    Apgar1: Not recorded     Apgar5: Not recorded    Living: Living    Name of Baby 3: Not recorded    Date: 12             GA: 36w0d Delivery: Vaginal, Spontaneous    Apgar1: Not recorded     Ashia Orozco: Not recorded    Living: Living      Past Medical History:   Diagnosis Date    Amenorrhea     Asthma     Decorative tattoo     Dyspareunia     Dysplasia of cervix 1995    cryo    Extrinsic asthma, unspecified     History of Clostridium difficile 2007       Past Surgical History:   Procedure Laterality Date    COLPOSCOPY, CERVIX W/UPPER ADJACENT VAGINA; W/BIOPSY(S), CERVIX      found abnormal cells.   froze them. have been fine ecer since          OTHER SURGICAL HISTORY  1995    cryo for cervical dysplasia    TONSILLECTOMY  1978       Family History   Problem Relation Age of Onset    Cancer Mother         stomach cancer    Cancer Father         parkinsons        Tobacco  Allergies  Meds  Med Hx  Surg Hx  Fam Hx  Soc Hx        Social History    Socioeconomic History      Marital status:       Spouse name: Not on file      Number of children: Not on file      Years of education: Not on file      Highest education level: Not on file    Occupational History      Not on file    Tobacco Use      Smoking status: Never      Smokeless tobacco: Never    Vaping Use      Vaping Use: Never used    Substance and Sexual Activity      Alcohol use: Not Currently      Drug use: Never      Sexual activity: Yes        Partners: Male    Other Topics      Concerns:        Not on file    Social History Narrative      Not on file    Social Determinants of Health  Financial Resource Strain: Not on file  Food Insecurity: Not on file  Transportation Needs: Not on file  Physical Activity: Not on file  Stress: Not on file  Social Connections: Not on file  Housing Stability: Not on file          /62   Ht 5' 4.5\" (1.638 m)   Wt 124 lb 10.7 oz (56.5 kg)   LMP 2019 (Exact Date)   Wt Readings from Last 3 Encounters:  23 : 124 lb 10.7 oz (56.5 kg)  23 : 126 lb 6.4 oz (57.3 kg)  23 : 125 lb (56.7 kg)    Colorectal Cancer Screening Never done  Asthma Action Plan Never done  COVID-19 Vaccine(1) Never done  Pneumococcal Vaccine: Birth to 64yrs(1 - PCV) Never done  Pap Smear Never done  DTaP,Tdap,and Td Vaccines(3 - Td or Tdap) due on 04/24/2017  Zoster Vaccines(1 of 2) Never done  Annual Depression Screening due on 01/01/2023  Annual Physical due on 09/19/2023  Mammogram due on 09/30/2023  Influenza Vaccine(1) due on 10/01/2023  Asthma Control Test due on 07/19/2024        Review of Systems   General: Present- Feeling well. Not Present- Chills, Fever, Weight Gain and Weight Loss. HEENT: Not Present- Headache and Sore Throat. Respiratory: Not Present- Cough, Difficulty Breathing, Hemoptysis and Sputum Production. Breast: Not Present- Breast Mass, Breast Pain and Nipple Discharge. Cardiovascular: Not Present- Chest Pain, Elevated Blood Pressure, Fainting / Blacking Out and Shortness of Breath. Gastrointestinal: Not Present- Bloody Stool, Change in Bowel Habits, Constipation, Diarrhea, Heartburn, Nausea, Bloating and Vomiting. Female Genitourinary: Not Present- Discharge, Dysuria, Frequency, Incontinence, Pelvic Pain, Urgency, Urinating at Night, Vaginal Bleeding, Vaginal Discharge, Vaginal dryness and Vaginal itching/burning. Musculoskeletal: Not Present- Leg Cramps and Swelling of Extremities. Neurological: Not Present- Dizziness and Headaches. Psychiatric: Not Present- Anxiety and Depression. Endocrine: Not Present- Appetite Changes, Hair Changes and Thyroid Problems. Hematology: Not Present- Blood Clots, Easy Bruising and Excessive bleeding. All other systems negative       Physical Exam   The physical exam findings are as follows:       General   Mental Status - Alert. General Appearance - Cooperative. Orientation - Oriented X4. Build & Nutrition - Well nourished. Head and Neck  Thyroid   Gland Characteristics - normal size and consistency.     Chest and Lung Exam   Inspection:   Chest Wall: - Normal.  Palpation: - Palpation normal.  Auscultation:   Breath sounds: - Normal.  Adventitious sounds: - No Adventitious sounds. Breast   Nipples: Characteristics - Bilateral - Normal. Discharge - Bilateral - None. Breast - Bilateral - Symmetric. Cardiovascular   Auscultation: Rhythm - Regular. Heart Sounds - Normal heart sounds. Murmurs & Other Heart Sounds: Auscultation of the heart reveals - No Murmurs. Abdomen   Inspection: Inspection of the abdomen reveals - No Hernias. Incisional scars - No incisional scars. Palpation/Percussion: Palpation and Percussion of the abdomen reveal - Non Tender and No Palpable abdominal masses. Liver: - Normal.      Female Genitourinary     External Genitalia   Perineum - Normal. Bartholin's Gland - Bilateral - Normal. Clitoris - Normal.  Introitus: Characteristics - No Cystocele, Enterocele or Rectocele. Discharge - None. Labia Majora: Lesions - Bilateral - None. Characteristics - Bilateral - Normal.  Urethra: Characteristics - Normal. Discharge - None. Lookout Gland - Bilateral - Normal.  Vulva: Lesions - None. Speculum & Bimanual   Vagina:   Vaginal Wall: - Normal.  Vaginal Lesions - None. Vaginal Mucosa - Normal.  Cervix: Characteristics - No Motion tenderness. Discharge - None. Uterus: Characteristics - Normal. Position - Midposition. Adnexa: Characteristics - Bilateral - Normal. Masses - No Adnexal Masses. Wet Mount: Main patient complaints - None. Rectal   Anorectal Exam: External - normal external exam.    Peripheral Vascular   Upper Extremity:   Palpation: - Pulses bilaterally normal.  Lower Extremity: Inspection - Bilateral - Inspection Normal.  Palpation: Edema - Bilateral - No edema. Neurologic   Mental Status: - Normal.    Lymphatic  General Lymphatics   Description - Normal .    1. Postmenopausal atrophic vaginitis    2.  Women's annual routine gynecological examination

## 2023-08-03 ENCOUNTER — TELEPHONE (OUTPATIENT)
Dept: INTERNAL MEDICINE CLINIC | Facility: CLINIC | Age: 53
End: 2023-08-03

## 2023-08-07 LAB — HPV I/H RISK 1 DNA SPEC QL NAA+PROBE: NEGATIVE

## 2024-08-07 ENCOUNTER — TELEPHONE (OUTPATIENT)
Dept: INTERNAL MEDICINE CLINIC | Facility: CLINIC | Age: 54
End: 2024-08-07

## 2024-08-07 NOTE — TELEPHONE ENCOUNTER
Patient called stated she is running 17 min late to her 930 physical appointment she accidentally went to the wrong office. Per GISELLA Mosley patient needs to reschedule. GISELLA Mosley stated she is able to add her at 1130 patient declined stated she is not able to. She stated she will go online and reschedule. Patient Apologized.

## 2024-08-07 NOTE — TELEPHONE ENCOUNTER
FYI [facility]    Future Appointments   Date Time Provider Department Center   8/15/2024  2:00 PM Peace Thompson MD [facility]   8/22/2024  9:30 AM Stephanie Martínez APRN [facility] [address] [facility]

## 2024-08-15 ENCOUNTER — OFFICE VISIT (OUTPATIENT)
Dept: OBGYN CLINIC | Facility: CLINIC | Age: 54
End: 2024-08-15

## 2024-08-15 VITALS
WEIGHT: 135.19 LBS | DIASTOLIC BLOOD PRESSURE: 72 MMHG | BODY MASS INDEX: 22.8 KG/M2 | HEIGHT: 64.5 IN | SYSTOLIC BLOOD PRESSURE: 110 MMHG

## 2024-08-15 DIAGNOSIS — Z12.31 ENCOUNTER FOR SCREENING MAMMOGRAM FOR BREAST CANCER: ICD-10-CM

## 2024-08-15 DIAGNOSIS — N95.2 POSTMENOPAUSAL ATROPHIC VAGINITIS: ICD-10-CM

## 2024-08-15 DIAGNOSIS — Z01.419 WOMEN'S ANNUAL ROUTINE GYNECOLOGICAL EXAMINATION: Primary | ICD-10-CM

## 2024-08-15 DIAGNOSIS — M62.89 PELVIC FLOOR DYSFUNCTION: ICD-10-CM

## 2024-08-15 PROCEDURE — 99396 PREV VISIT EST AGE 40-64: CPT | Performed by: OBSTETRICS & GYNECOLOGY

## 2024-08-15 RX ORDER — ESTRADIOL 0.1 MG/G
CREAM VAGINAL
Qty: 42.5 G | Refills: 3 | Status: SHIPPED | OUTPATIENT
Start: 2024-08-15

## 2024-08-15 NOTE — PROGRESS NOTES
[facility]  ORTHOPEDIC LOWER EXTREMITY POST OP NOTE   Admission Date: 8/28/2023  Today's Date: 8/30/2023    Hospital Day: Hospital Day: 3  Post Operative Day: 1 Day Post-Op   Attending: Hadley Livingston MD     Surgeon(s) and Role:     * Omar Villatoro, DPM - Primary   AMPUTATION, FOOT, TRANSMETATARSAL LEFT. with gastroc resession     Subjective    SUBJECTIVE     Ari Schmitz is a 76 year old male admitted with Cellulitis of left foot [L03.116]  Ulcer of foot, chronic, left, with unspecified severity (CMD) [L97.529]  Osteomyelitis of left foot, unspecified type (CMD) [M86.9] status post AMPUTATION, FOOT, TRANSMETATARSAL LEFT. with gastroc resession.    Pain controlled.  No chest pain, shortness of breath.  No calf pain.      Objective    PHYSICAL EXAMINATION     Vital Signs Last Value 24 Hour Range   Temperature 96.8 °F (36 °C) Temp  Min: 96.8 °F (36 °C)  Max: 98.6 °F (37 °C)   Pulse 85 Pulse  Min: 68  Max: 87   Respiratory 18 Resp  Min: 16  Max: 18   Blood Pressure 125/75 BP  Min: 120/73  Max: 140/79   Pulse Oximetry 94 % SpO2  Min: 94 %  Max: 100 %     General:  Awake, alert, no acute distress.    Musculoskeletal:   LLE: TMA incision without signs of infection or ischemia                    TEST RESULTS  Labs Since Admission  Micro Summary  Imaging      Labs:   Recent Labs   Lab 08/29/23  0336 08/28/23  1314   WBC 10.1 13.6*   HGB 11.4* 12.6*   HCT 34.4* 36.7*    224       Recent Labs   Lab 08/30/23  0342 08/29/23  0336 08/28/23  1314   SODIUM 138 138 136   POTASSIUM 4.5 3.6 3.8   CHLORIDE 109 106 106   CO2 28 27 27   CALCIUM 8.4 7.9* 9.0   GLUCOSE 167* 102* 104*   BUN 21* 18 20   CREATININE 1.33* 1.24* 1.36*   MG  --  2.1  --         No results found      Radiology: Imaging studies have been reviewed and pertinent findings discussed in the Assessment and Plan.  Results for orders placed or performed during the hospital encounter of 08/28/23 (from the past 48 hour(s))   XR CHEST  [facility]  Obstetrics and Gynecology  Gynecology Visit    Chief Complaint   Patient presents with    Annual           Chitramanuela Black is a 54 year old female who presents for Annual exam.    LMP: Menopause.    Menses regular: n/a.    Menstrual flow normal: n/a.    Birth control or HRT:  Estrace.   Refill Yes  Last Pap Smear: 23.  Any history of abnormal paps: No   Last MM22- Ordered by pcp  Any Medication Refills needed today?: Yes  Sleep: 6-7 hrs.    Diet: Balanced- Vegan.    Exercise: 6 x week.   Screening labs/Blood work today: No.     Colonoscopy (if over 44 y/o): Cologuard - nl .   Gardasil:(age 9-44 y/o) n/a.   Genetic Cancer screen (if indicated): No.   Flu (Aug-April): No.TDAP (every 10 years) Declined.      Additional Problems/concerns: Patient reports estrace has not been working..      Next Appt: Declined today    Immunization History   Administered Date(s) Administered    DT 10/08/2003    TDAP 2007         Current Outpatient Medications:     estradiol (ESTRACE) 0.1 MG/GM Vaginal Cream, Place 1 g vaginally every evening for 14 days, THEN 1 g twice a week., Disp: 42.5 g, Rfl: 3    estradiol (ESTRACE) 0.1 MG/GM Vaginal Cream, Place 1 g vaginally daily. Nightly for first week, then twice weekly., Disp: 42.5 g, Rfl: 3    albuterol 108 (90 Base) MCG/ACT Inhalation Aero Soln, Inhale 1 puff into the lungs every 4 (four) hours as needed for Wheezing or Shortness of Breath., Disp: 3 each, Rfl: 0    fluticasone furoate-vilanterol (BREO ELLIPTA) 100-25 MCG/ACT Inhalation Aerosol Powder, Breath Activated, Inhale 1 puff into the lungs daily., Disp: 3 each, Rfl: 0    fluticasone furoate-vilanterol 100-25 MCG/INH Inhalation Aerosol Powder, Breath Activated, , Disp: , Rfl:     Allergies   Allergen Reactions    Ragweed        OB History    Para Term  AB Living   3 2 2 0 1 2   SAB IAB Ectopic Multiple Live Births   0 0 0 0 2      # Outcome Date GA Lbr Shubham/2nd Weight Sex Type Anes  AP OR PA    Impression    IMPRESSION:     1.   No pneumothorax.  2.   Minimal bibasilar atelectasis.      I, Attending Radiologist Melissa Sage MD, have reviewed the images and  report and concur with these findings interpreted by Resident Radiologist,  Phong Perez MD.    Electronically Signed by: Melissa Sage MD   Signed on: 8/29/2023 4:31 PM   Workstation ID: [id number]             ASSESSMENT AND PLAN   Cellulitis of left foot [L03.116]  Ulcer of foot, chronic, left, with unspecified severity (CMD) [L97.529]  Osteomyelitis of left foot, unspecified type (CMD) [M86.9] s/p AMPUTATION, FOOT, TRANSMETATARSAL LEFT. with gastroc resession   No complications    NWB LLE; may use heel for transfers only  Bone path pending  Abx per ID  HBO following     The patient's treatment plans were discussed with patient.          Jerrell Ortiz PA-C  [facility]  Pager: [phone]  In House: [phone] (4w-4 M-F)  Weekends/After Hours: contact on call Ortho answering service              PTL Lv   3 Term  36w0d  8 lb 1 oz (3.657 kg) F Vag-Spont   YULI   2 Term  36w0d  7 lb 14 oz (3.572 kg) M Vag-Spont   YULI   1 AB                Past Medical History:    Amenorrhea    Asthma (HCC)    Decorative tattoo    Dyspareunia    Dysplasia of cervix    cryo    Extrinsic asthma, unspecified    History of Clostridium difficile       Past Surgical History:   Procedure Laterality Date    Colposcopy, cervix w/upper adjacent vagina; w/biopsy(s), cervix      found abnormal cells.  froze them. have been fine ecer since          Other surgical history  1995    cryo for cervical dysplasia    Tonsillectomy  1978       Family History   Problem Relation Age of Onset    Cancer Mother         stomach cancer    Cancer Father         parkinsons        Meds         Social History     Socioeconomic History    Marital status:      Spouse name: Not on file    Number of children: Not on file    Years of education: Not on file    Highest education level: Not on file   Occupational History    Not on file   Tobacco Use    Smoking status: Never     Passive exposure: Never    Smokeless tobacco: Never   Vaping Use    Vaping status: Never Used   Substance and Sexual Activity    Alcohol use: Not Currently    Drug use: Never    Sexual activity: Yes     Partners: Male   Other Topics Concern    Not on file   Social History Narrative    Not on file     Social Determinants of Health     Financial Resource Strain: Not on file   Food Insecurity: Not on file   Transportation Needs: Not on file   Physical Activity: Not on file   Stress: Not on file   Social Connections: Not on file   Housing Stability: At Risk (2023)    [facility]     Living Situation: Not on file     Housing Problems: Not on file         /72   Ht 5' 4.5\" (1.638 m)   Wt 135 lb 3.2 oz (61.3 kg)   LMP 2019 (Exact Date)   Wt Readings from Last 3 Encounters:   08/15/24 135 lb 3.2 oz (61.3 kg)   23  124 lb 10.7 oz (56.5 kg)   07/19/23 126 lb 6.4 oz (57.3 kg)     Health Maintenance   Topic Date Due    Asthma Control Test  Never done    Pneumococcal Vaccine: Birth to 64yrs (1 of 2 - PCV) Never done    DTaP,Tdap,and Td Vaccines (3 - Td or Tdap) 04/24/2017    Zoster Vaccines (1 of 2) Never done    COVID-19 Vaccine (1 - 2023-24 season) Never done    Annual Physical  09/19/2023    Mammogram  09/30/2023    Asthma Action Plan  07/19/2024    Influenza Vaccine (1) 10/01/2024    Pap Smear  07/20/2026    Colorectal Cancer Screening  07/28/2026    Annual Depression Screening  Completed           Review of Systems   General: Present- Feeling well. Not Present- Chills, Fever, Weight Gain and Weight Loss.  HEENT: Not Present- Headache and Sore Throat.  Respiratory: Not Present- Cough, Difficulty Breathing, Hemoptysis and Sputum Production.  Breast: Not Present- Breast Mass, Breast Pain and Nipple Discharge.  Cardiovascular: Not Present- Chest Pain, Elevated Blood Pressure, Fainting / Blacking Out and Shortness of Breath.  Gastrointestinal: Not Present- Bloody Stool, Change in Bowel Habits, Constipation, Diarrhea, Heartburn, Nausea, Bloating and Vomiting.  Female Genitourinary: Not Present- Discharge, Dysuria, Frequency, Incontinence, Pelvic Pain, Urgency, Urinating at Night, Vaginal Bleeding, Vaginal Discharge, Vaginal dryness and Vaginal itching/burning.  Musculoskeletal: Not Present- Leg Cramps and Swelling of Extremities.  Neurological: Not Present- Dizziness and Headaches.  Psychiatric: Not Present- Anxiety and Depression.  Endocrine: Not Present- Appetite Changes, Hair Changes and Thyroid Problems.  Hematology: Not Present- Blood Clots, Easy Bruising and Excessive bleeding.  All other systems negative       Physical Exam   The physical exam findings are as follows:       General   Mental Status - Alert. General Appearance - Cooperative. Orientation - Oriented X4. Build & Nutrition - Well nourished.      Head and  Neck  Thyroid   Gland Characteristics - normal size and consistency.    Chest and Lung Exam   Inspection:   Chest Wall: - Normal.  Palpation: - Palpation normal.  Auscultation:   Breath sounds: - Normal.  Adventitious sounds: - No Adventitious sounds.    Breast   Nipples: Characteristics - Bilateral - Normal. Discharge - Bilateral - None.  Breast - Bilateral - Symmetric.       Cardiovascular   Auscultation: Rhythm - Regular. Heart Sounds - Normal heart sounds.  Murmurs & Other Heart Sounds: Auscultation of the heart reveals - No Murmurs.      Abdomen   Inspection: Inspection of the abdomen reveals - No Hernias. Incisional scars - No incisional scars.  Palpation/Percussion: Palpation and Percussion of the abdomen reveal - Non Tender and No Palpable abdominal masses.  Liver: - Normal.      Female Genitourinary     External Genitalia   Perineum - Normal. Bartholin's Gland - Bilateral - Normal. Clitoris - Normal.  Introitus: Characteristics - No Cystocele, Enterocele or Rectocele. Discharge - None.  Labia Majora: Lesions - Bilateral - None. Characteristics - Bilateral - Normal.  Urethra: Characteristics - Normal. Discharge - None.  [place] Gland - Bilateral - Normal.  Vulva: Lesions - None.    Speculum & Bimanual   Vagina:   Vaginal Wall: - Normal.  Vaginal Lesions - None. Vaginal Mucosa - Normal.  Cervix: Characteristics - No Motion tenderness. Discharge - None.  Uterus: Characteristics - Normal. Position - Midposition.  Adnexa: Characteristics - Bilateral - Normal. Masses - No Adnexal Masses.  Wet [place]: Main patient complaints - None.     Rectal   Anorectal Exam: External - normal external exam.    Peripheral Vascular   Upper Extremity:   Palpation: - Pulses bilaterally normal.  Lower Extremity: Inspection - Bilateral - Inspection Normal.  Palpation: Edema - Bilateral - No edema.    Neurologic   Mental Status: - Normal.    Lymphatic  General Lymphatics   Description - Normal .    1. Women's annual routine gynecological  examination    2. Postmenopausal atrophic vaginitis    3. Pelvic floor dysfunction    4. Encounter for screening mammogram for breast cancer

## 2024-08-16 ENCOUNTER — LAB ENCOUNTER (OUTPATIENT)
Dept: LAB | Age: 54
End: 2024-08-16
Attending: NURSE PRACTITIONER
Payer: COMMERCIAL

## 2024-08-16 DIAGNOSIS — Z00.00 ROUTINE GENERAL MEDICAL EXAMINATION AT HEALTH CARE FACILITY: Primary | ICD-10-CM

## 2024-08-16 DIAGNOSIS — Z13.220 SCREENING FOR HYPERLIPIDEMIA: ICD-10-CM

## 2024-08-16 DIAGNOSIS — Z13.0 SCREENING FOR BLOOD DISEASE: ICD-10-CM

## 2024-08-16 DIAGNOSIS — Z13.29 SCREENING FOR THYROID DISORDER: ICD-10-CM

## 2024-08-16 DIAGNOSIS — Z13.228 SCREENING FOR METABOLIC DISORDER: ICD-10-CM

## 2024-08-16 DIAGNOSIS — Z00.00 ROUTINE GENERAL MEDICAL EXAMINATION AT HEALTH CARE FACILITY: ICD-10-CM

## 2024-08-16 LAB
ALBUMIN SERPL-MCNC: 4.9 G/DL (ref 3.2–4.8)
ALBUMIN/GLOB SERPL: 2.5 {RATIO} (ref 1–2)
ALP LIVER SERPL-CCNC: 71 U/L
ALT SERPL-CCNC: 18 U/L
ANION GAP SERPL CALC-SCNC: 5 MMOL/L (ref 0–18)
AST SERPL-CCNC: 26 U/L (ref ?–34)
BASOPHILS # BLD AUTO: 0.04 X10(3) UL (ref 0–0.2)
BASOPHILS NFR BLD AUTO: 0.8 %
BILIRUB SERPL-MCNC: 1.6 MG/DL (ref 0.3–1.2)
BUN BLD-MCNC: 14 MG/DL (ref 9–23)
CALCIUM BLD-MCNC: 10.1 MG/DL (ref 8.7–10.4)
CHLORIDE SERPL-SCNC: 105 MMOL/L (ref 98–112)
CHOLEST SERPL-MCNC: 189 MG/DL (ref ?–200)
CO2 SERPL-SCNC: 28 MMOL/L (ref 21–32)
CREAT BLD-MCNC: 0.74 MG/DL
EGFRCR SERPLBLD CKD-EPI 2021: 97 ML/MIN/1.73M2 (ref 60–?)
EOSINOPHIL # BLD AUTO: 0.16 X10(3) UL (ref 0–0.7)
EOSINOPHIL NFR BLD AUTO: 3.2 %
ERYTHROCYTE [DISTWIDTH] IN BLOOD BY AUTOMATED COUNT: 13.2 %
FASTING PATIENT LIPID ANSWER: YES
FASTING STATUS PATIENT QL REPORTED: YES
GLOBULIN PLAS-MCNC: 2 G/DL (ref 2–3.5)
GLUCOSE BLD-MCNC: 84 MG/DL (ref 70–99)
HCT VFR BLD AUTO: 40.2 %
HDLC SERPL-MCNC: 65 MG/DL (ref 40–59)
HGB BLD-MCNC: 13.7 G/DL
IMM GRANULOCYTES # BLD AUTO: 0.01 X10(3) UL (ref 0–1)
IMM GRANULOCYTES NFR BLD: 0.2 %
LDLC SERPL CALC-MCNC: 108 MG/DL (ref ?–100)
LYMPHOCYTES # BLD AUTO: 1.37 X10(3) UL (ref 1–4)
LYMPHOCYTES NFR BLD AUTO: 27.7 %
MCH RBC QN AUTO: 30.6 PG (ref 26–34)
MCHC RBC AUTO-ENTMCNC: 34.1 G/DL (ref 31–37)
MCV RBC AUTO: 89.7 FL
MONOCYTES # BLD AUTO: 0.37 X10(3) UL (ref 0.1–1)
MONOCYTES NFR BLD AUTO: 7.5 %
NEUTROPHILS # BLD AUTO: 3 X10 (3) UL (ref 1.5–7.7)
NEUTROPHILS # BLD AUTO: 3 X10(3) UL (ref 1.5–7.7)
NEUTROPHILS NFR BLD AUTO: 60.6 %
NONHDLC SERPL-MCNC: 124 MG/DL (ref ?–130)
OSMOLALITY SERPL CALC.SUM OF ELEC: 286 MOSM/KG (ref 275–295)
PLATELET # BLD AUTO: 188 10(3)UL (ref 150–450)
POTASSIUM SERPL-SCNC: 4.1 MMOL/L (ref 3.5–5.1)
PROT SERPL-MCNC: 6.9 G/DL (ref 5.7–8.2)
RBC # BLD AUTO: 4.48 X10(6)UL
SODIUM SERPL-SCNC: 138 MMOL/L (ref 136–145)
TRIGL SERPL-MCNC: 86 MG/DL (ref 30–149)
TSI SER-ACNC: 2.25 MIU/ML (ref 0.55–4.78)
VLDLC SERPL CALC-MCNC: 15 MG/DL (ref 0–30)
WBC # BLD AUTO: 5 X10(3) UL (ref 4–11)

## 2024-08-16 PROCEDURE — 80053 COMPREHEN METABOLIC PANEL: CPT

## 2024-08-16 PROCEDURE — 80061 LIPID PANEL: CPT

## 2024-08-16 PROCEDURE — 84443 ASSAY THYROID STIM HORMONE: CPT

## 2024-08-16 PROCEDURE — 85025 COMPLETE CBC W/AUTO DIFF WBC: CPT

## 2024-08-16 PROCEDURE — 36415 COLL VENOUS BLD VENIPUNCTURE: CPT

## 2024-08-22 ENCOUNTER — OFFICE VISIT (OUTPATIENT)
Dept: INTERNAL MEDICINE CLINIC | Facility: CLINIC | Age: 54
End: 2024-08-22
Payer: COMMERCIAL

## 2024-08-22 ENCOUNTER — TELEPHONE (OUTPATIENT)
Dept: INTERNAL MEDICINE CLINIC | Facility: CLINIC | Age: 54
End: 2024-08-22

## 2024-08-22 VITALS
HEART RATE: 64 BPM | OXYGEN SATURATION: 99 % | HEIGHT: 64.5 IN | TEMPERATURE: 96 F | SYSTOLIC BLOOD PRESSURE: 110 MMHG | WEIGHT: 136.19 LBS | DIASTOLIC BLOOD PRESSURE: 70 MMHG | BODY MASS INDEX: 22.97 KG/M2

## 2024-08-22 DIAGNOSIS — Z86.19 HISTORY OF CLOSTRIDIUM DIFFICILE INFECTION: ICD-10-CM

## 2024-08-22 DIAGNOSIS — J45.20 MILD INTERMITTENT ASTHMA WITHOUT COMPLICATION (HCC): ICD-10-CM

## 2024-08-22 DIAGNOSIS — E78.00 PURE HYPERCHOLESTEROLEMIA: ICD-10-CM

## 2024-08-22 DIAGNOSIS — Z00.00 ROUTINE GENERAL MEDICAL EXAMINATION AT A HEALTH CARE FACILITY: Primary | ICD-10-CM

## 2024-08-22 PROCEDURE — 99396 PREV VISIT EST AGE 40-64: CPT | Performed by: NURSE PRACTITIONER

## 2024-08-22 RX ORDER — ALBUTEROL SULFATE 90 UG/1
1 AEROSOL, METERED RESPIRATORY (INHALATION) EVERY 4 HOURS PRN
Qty: 3 EACH | Refills: 1 | Status: SHIPPED | OUTPATIENT
Start: 2024-08-22

## 2024-08-22 RX ORDER — FLUTICASONE FUROATE AND VILANTEROL 100; 25 UG/1; UG/1
1 POWDER RESPIRATORY (INHALATION) DAILY
Qty: 3 EACH | Refills: 1 | Status: SHIPPED | OUTPATIENT
Start: 2024-08-22

## 2024-08-22 NOTE — PROGRESS NOTES
Chitra Black is a 54 year old female who presents for a complete physical exam:       Patient complains of:     Asthma  score 17   breo daily  albuterol PRN.  She has struggled the past few months with allergies.  She had allergy testing done via blood draw as she felt she was having sensitivity reaction to certain foods.    recently provided epipen.    She is not taking antihistamine regularly.    She has been on singulair in the past.  Uncertain how it \"worked\" for her.    She does not appear ill or in any respiratory distress today.  Her appearance does not reflect her score of 17.    She is seeing an allergist next week.   She will keep food diary.  Has appt with Juan akins.   Will also provide Dr Gomez.        Wt Readings from Last 6 Encounters:   08/22/24 136 lb 3.2 oz (61.8 kg)   08/15/24 135 lb 3.2 oz (61.3 kg)   07/20/23 124 lb 10.7 oz (56.5 kg)   07/19/23 126 lb 6.4 oz (57.3 kg)   05/26/23 125 lb (56.7 kg)   05/23/23 125 lb 10.6 oz (57 kg)       Cholesterol, Total (mg/dL)   Date Value   08/16/2024 189   10/10/2022 182     HDL Cholesterol (mg/dL)   Date Value   08/16/2024 65 (H)   10/10/2022 66 (H)     LDL Cholesterol (mg/dL)   Date Value   08/16/2024 108 (H)   10/10/2022 105 (H)     AST (U/L)   Date Value   08/16/2024 26   10/10/2022 21     ALT (U/L)   Date Value   08/16/2024 18   10/10/2022 22        Current Outpatient Medications   Medication Sig Dispense Refill    albuterol 108 (90 Base) MCG/ACT Inhalation Aero Soln Inhale 1 puff into the lungs every 4 (four) hours as needed for Wheezing or Shortness of Breath. 3 each 1    fluticasone furoate-vilanterol (BREO ELLIPTA) 100-25 MCG/ACT Inhalation Aerosol Powder, Breath Activated Inhale 1 puff into the lungs daily. 3 each 1    estradiol (ESTRACE) 0.1 MG/GM Vaginal Cream Place 1 g vaginally every evening for 14 days, THEN 1 g twice a week. 42.5 g 3    estradiol (ESTRACE) 0.1 MG/GM Vaginal Cream Place 1 g vaginally daily. Nightly for first week,  then twice weekly. 42.5 g 3    fluticasone furoate-vilanterol 100-25 MCG/INH Inhalation Aerosol Powder, Breath Activated         Past Medical History:    Amenorrhea    Asthma (HCC)    Decorative tattoo    Dyspareunia    Dysplasia of cervix    cryo    Extrinsic asthma, unspecified    History of Clostridium difficile      Past Surgical History:   Procedure Laterality Date    Colposcopy, cervix w/upper adjacent vagina; w/biopsy(s), cervix      found abnormal cells.  froze them. have been fine ecer since          Other surgical history  1995    cryo for cervical dysplasia    Tonsillectomy  1978      Family History   Problem Relation Age of Onset    Cancer Mother         stomach cancer    Cancer Father         parkinsons           Health Maintenance  Immunizations: defers   Immunization History   Administered Date(s) Administered    DT 10/08/2003    TDAP 2007   Pended Date(s) Pended    Td 10/08/2003     Dental Visits: yes   Colon cancer screening:    Health Maintenance   Topic Date Due    Colorectal Cancer Screening  2026      Gyne:     Health Maintenance   Topic Date Due    Pap Smear  2026            History of dysplasia:  No  Menstrual Cycle: post            Breast Cancer Screening: scheduled.     Health Maintenance   Topic Date Due    Mammogram  2023        Bone Density:      Osteoperosis Prevention:    Osteoperosis Prevention was discussed.  Reviewed Calcium, Vitamin D supplementation and Weight Bearing Exercises.    Patient is performing weight bearing exercises.  Patient is currently taking Vitamin D supplementation.        REVIEW OF SYSTEMS:   GENERAL: feels well otherwise  SKIN: denies any unusual skin lesions  EYES:denies blurred vision or double vision  HEENT: denies nasal congestion, sinus pain or ST  LUNGS:as above   CARDIOVASCULAR: denies chest pain on exertion  GI: denies abdominal pain,denies heartburn  : denies dysuria, vaginal discharge or  itching  MUSCULOSKELETAL: denies back pain  NEURO: denies headaches  PSYCH: no c/o      EXAM:   /70   Pulse 64   Temp (!) 96.2 °F (35.7 °C) (Temporal)   Ht 5' 4.5\" (1.638 m)   Wt 136 lb 3.2 oz (61.8 kg)   LMP 04/01/2019 (Exact Date)   SpO2 99%   BMI 23.02 kg/m²   Body mass index is 23.02 kg/m².   GENERAL: well developed, well nourished,in no apparent distress  SKIN: no rashes,no suspicious lesions  HEENT: atraumatic, normocephalic,ears and throat are clear  EYES:PERRLA, EOMI, conjunctiva are clear  NECK: supple,no adenopathy,  LUNGS: clear to auscultation  good air movement and no wheezing.   CARDIO: RRR without murmur  GI: good BS's,no masses, HSM or tenderness  MUSCULOSKELETAL: back is not tender,  EXTREMITIES: no edema  NEURO: Oriented times three,cranial nerves are intact,motor and sensory are grossly intact    ASSESSMENT AND PLAN:      HEALTH MAINTENANCE:  labs reviewed.  Mammogram scheduled.  Well woman with gyne.      Encounter Diagnoses   Name Primary?    Routine general medical examination at a health care facility Yes    Pure hypercholesterolemia stalbe   montor.      Mild intermittent asthma without complication (HCC)  not well controllled  she prefers to see allergist next week as scheudled.  Will f/u if not improving.  Also given Dr Gomez information.       History of Clostridium difficile infection        No orders of the defined types were placed in this encounter.      Meds & Refills for this Visit:  Requested Prescriptions     Signed Prescriptions Disp Refills    albuterol 108 (90 Base) MCG/ACT Inhalation Aero Soln 3 each 1     Sig: Inhale 1 puff into the lungs every 4 (four) hours as needed for Wheezing or Shortness of Breath.    fluticasone furoate-vilanterol (BREO ELLIPTA) 100-25 MCG/ACT Inhalation Aerosol Powder, Breath Activated 3 each 1     Sig: Inhale 1 puff into the lungs daily.       Imaging & Consults:  None    No follow-ups on file.  There are no Patient Instructions on file  for this visit.

## 2024-08-24 ENCOUNTER — HOSPITAL ENCOUNTER (OUTPATIENT)
Dept: MAMMOGRAPHY | Age: 54
Discharge: HOME | End: 2024-08-24
Attending: OBSTETRICS & GYNECOLOGY
Payer: COMMERCIAL

## 2024-08-24 DIAGNOSIS — Z12.31 ENCOUNTER FOR SCREENING MAMMOGRAM FOR BREAST CANCER: ICD-10-CM

## 2024-08-24 PROCEDURE — 77067 SCR MAMMO BI INCL CAD: CPT | Performed by: OBSTETRICS & GYNECOLOGY

## 2024-08-24 PROCEDURE — 77063 BREAST TOMOSYNTHESIS BI: CPT | Performed by: OBSTETRICS & GYNECOLOGY

## 2024-09-03 ENCOUNTER — PATIENT MESSAGE (OUTPATIENT)
Dept: OBGYN CLINIC | Facility: CLINIC | Age: 54
End: 2024-09-03

## 2024-09-04 NOTE — TELEPHONE ENCOUNTER
From: Chitra Black  To: Peace Thompson  Sent: 9/3/2024 6:24 PM CDT  Subject: Estradiol cream BAD side affects     Hi,  Since using this.. I sometimes would get headaches and nausea the next morning (somewhat mild) and would pass within a couple hours.   I took it before bed on Sunday Sept 1. And woke up with the most intense migraine I’ve ever experienced plus throwing up several times. It was terrible!! I do not want to continue this medication- I was terrified and don’t want to experience this again. Is there anything else i can use?    Thanks so much-    Chitra Black.

## 2024-09-09 NOTE — TELEPHONE ENCOUNTER
LM to discontinue medication   Please give her information about [facility] to use instead.    Peace Thompson MD

## 2024-10-31 ENCOUNTER — OFFICE VISIT (OUTPATIENT)
Dept: OBGYN CLINIC | Facility: CLINIC | Age: 54
End: 2024-10-31
Payer: COMMERCIAL

## 2024-10-31 VITALS — DIASTOLIC BLOOD PRESSURE: 78 MMHG | WEIGHT: 135 LBS | BODY MASS INDEX: 23 KG/M2 | SYSTOLIC BLOOD PRESSURE: 130 MMHG

## 2024-10-31 DIAGNOSIS — N95.1 SYMPTOMATIC MENOPAUSAL OR FEMALE CLIMACTERIC STATES: ICD-10-CM

## 2024-10-31 DIAGNOSIS — R10.2 PELVIC PAIN: ICD-10-CM

## 2024-10-31 DIAGNOSIS — N95.2 POSTMENOPAUSAL ATROPHIC VAGINITIS: Primary | ICD-10-CM

## 2024-10-31 PROCEDURE — 99214 OFFICE O/P EST MOD 30 MIN: CPT | Performed by: OBSTETRICS & GYNECOLOGY

## 2024-10-31 RX ORDER — PROGESTERONE 200 MG/1
200 CAPSULE ORAL DAILY
Qty: 40 CAPSULE | Refills: 1 | Status: SHIPPED | OUTPATIENT
Start: 2024-10-31

## 2024-10-31 RX ORDER — AZELASTINE 1 MG/ML
SPRAY, METERED NASAL
COMMUNITY
Start: 2024-10-23

## 2024-10-31 RX ORDER — ESTRADIOL 0.07 MG/D
1 FILM, EXTENDED RELEASE TRANSDERMAL
Qty: 24 PATCH | Refills: 4 | Status: SHIPPED | OUTPATIENT
Start: 2024-10-31

## 2024-10-31 NOTE — PROGRESS NOTES
Chief Complaint   Patient presents with    Follow - Up         Chitra Black is a 54 year old female who presents for  estradiol cream,  lower abdominal pain , .    LMP: postmenopausal .    Menses regular: n/a `.    Menstrual flow normal: n/a .    Birth control or HRT:none  .   Refill none   Last Pap Smear: 2022    . Any history of abnormal paps:none    Gardasil:(age 10-46 y/o) . N/a    Any medication refills needed today?: none     Problems/concerns: no other concerns .      Next Appt:  no other concerns         Immunization History   Administered Date(s) Administered    DT 10/08/2003    TDAP 2007         Current Outpatient Medications:     albuterol 108 (90 Base) MCG/ACT Inhalation Aero Soln, Inhale 1 puff into the lungs every 4 (four) hours as needed for Wheezing or Shortness of Breath., Disp: 3 each, Rfl: 1    fluticasone furoate-vilanterol (BREO ELLIPTA) 100-25 MCG/ACT Inhalation Aerosol Powder, Breath Activated, Inhale 1 puff into the lungs daily., Disp: 3 each, Rfl: 1    fluticasone furoate-vilanterol 100-25 MCG/INH Inhalation Aerosol Powder, Breath Activated, , Disp: , Rfl:     azelastine 0.1 % Nasal Solution, , Disp: , Rfl:     estradiol (ESTRACE) 0.1 MG/GM Vaginal Cream, Place 1 g vaginally every evening for 14 days, THEN 1 g twice a week. (Patient not taking: Reported on 10/31/2024), Disp: 42.5 g, Rfl: 3    estradiol (ESTRACE) 0.1 MG/GM Vaginal Cream, Place 1 g vaginally daily. Nightly for first week, then twice weekly. (Patient not taking: Reported on 10/31/2024), Disp: 42.5 g, Rfl: 3    Allergies[1]    OB History    Para Term  AB Living   3 2 2 0 1 2   SAB IAB Ectopic Multiple Live Births   0 0 0 0 2      # Outcome Date GA Lbr Shubham/2nd Weight Sex Type Anes PTL Lv   3 Term  36w0d  8 lb 1 oz (3.657 kg) F Vag-Spont   YULI   2 Term  36w0d  7 lb 14 oz (3.572 kg) M Vag-Spont   YULI   1 AB                Past Medical History:    Amenorrhea    Asthma (HCC)    Decorative tattoo     Dyspareunia    Dysplasia of cervix    cryo    Extrinsic asthma, unspecified    History of Clostridium difficile       Past Surgical History:   Procedure Laterality Date    Colposcopy, cervix w/upper adjacent vagina; w/biopsy(s), cervix      found abnormal cells.  froze them. have been fine ecer since          Other surgical history  1995    cryo for cervical dysplasia    Tonsillectomy  1978       Family History   Problem Relation Age of Onset    Cancer Mother         stomach cancer    Cancer Father         parkinsons        Meds         Social History     Socioeconomic History    Marital status:      Spouse name: Not on file    Number of children: Not on file    Years of education: Not on file    Highest education level: Not on file   Occupational History    Not on file   Tobacco Use    Smoking status: Never     Passive exposure: Never    Smokeless tobacco: Never   Vaping Use    Vaping status: Never Used   Substance and Sexual Activity    Alcohol use: Not Currently    Drug use: Never    Sexual activity: Yes     Partners: Male   Other Topics Concern    Not on file   Social History Narrative    Not on file     Social Drivers of Health     Financial Resource Strain: Not on file   Food Insecurity: Not on file   Transportation Needs: Not on file   Physical Activity: Not on file   Stress: Not on file   Social Connections: Not on file   Housing Stability: At Risk (2023)    Received from [facility]     Living Situation: Not on file     Housing Problems: Not on file     LMP 2019 (Exact Date)   Wt Readings from Last 3 Encounters:   24 136 lb 3.2 oz (61.8 kg)   08/15/24 135 lb 3.2 oz (61.3 kg)   23 124 lb 10.7 oz (56.5 kg)     Health Maintenance   Topic Date Due    Asthma Control Test  Never done    Pneumococcal Vaccine: Birth to 64yrs (1 of 2 - PCV) Never done    DTaP,Tdap,and Td Vaccines (3 - Td or Tdap) 2017    Zoster Vaccines (1 of 2) Never done     COVID-19 Vaccine (1 - 2023-24 season) Never done    Influenza Vaccine (1) Never done    Annual Physical  08/22/2025    Mammogram  08/24/2025    Pap Smear  07/20/2026    Colorectal Cancer Screening  07/28/2026    Annual Depression Screening  Completed         Review of Systems   General: Present- Feeling well.  Cardiovascular: Not Present- Chest Pain, Elevated Blood Pressure, Leg Pain and/or Swelling and Shortness of Breath.  Gastrointestinal: Not Present- Nausea and Vomiting.  Female Genitourinary: Not Present- Discharge, Dysmenorrhea, Dysuria, Excessive Menstrual Bleeding and Pelvic Pain.  Musculoskeletal: Not Present- Leg Cramps and Swelling of Extremities.  Neurological: Not Present- Headaches.  Psychiatric: Not Present- Anxiety and Depression.  All other systems negative       Physical Exam   The physical exam findings are as follows:       General   Mental Status - Alert. General Appearance - Well Developed/Well Nourished/No acute distress/ NC/AT.      Note: More than 50% of this visit was spent in counseling or coordinating care for the following reason unable to tolerate topical estrogen or any lubrication to vulva and sitz baths not helping vulvar discomfort.  Will attempt a more systemic approach to estrogen to see if it helps symptoms.     Alexandra-Menopause/MENOPAUSE TIPS    THE NEW MENOPAUSE by: Dr. Leilani Cueva    NEXT LEVEL: Your Guide to kicking Ass, Feeling Great, and Crushing Goals Through Menopause and Beyond by: Dianna Mcrae PhD    Dietary changes, exercise and weight loss are cornerstones of management of Menopause     Check EPA list of 'Clean 15' and 'dirty dozen' while deciding to buy organic  Organic foods have less pesticides and chemical contaminants     Diet changes :  Best evidence is for whole food plant based/Mediterranean diet for health.  Small portion sized meals.  Metabolism changes in menopause require 200 Kcal/day less to maintain weight    High Protein at least 100g per day  Low  carb - 30-40 gm with each meal.  High Fiber 25-30 gm each day (not included in supplements)    NO Sugar, Soda, Juices and Sweets.  --- Fill half of your plate with low carb veggies and greens as discussed.  -- eat low sugar fruits apple, pears and berries: blueberries, strawberries, raspberries etc.    -- avoid tropical fruits like pineapple, viki, grapes, watermelon, papaya  -- about 25% fruits and 75% veggies  to reduce sugar intake    More freshly prepared meals than frozen or take-out  More plant based foods than processed meats or foods  (try to keep ingredients <5)  Incorporate good fats - Eg. :Extra virgin olive oil, Nuts, Avocado.     Consider starting the following supplements:  Vit B12  (chewable or drops version as it requires salivia for activation)    Vitamin D with K2 supplementation  Magnesium L threonate or glycinate  DHA & EPA  Probiotics      Exercise :  Brisk walk 30-45 min every day or a total of 150 min per week - Goal 12,000 steps/daily  Lift weights 3-4 times a week - Heavy - 3 Sets of 7 Heavy reps  - whole body dumbbell or machines  Core exercises (bird dog, plank with mountain climbers, dead bug) 3 sets of 10 (three - four times a week )  Balance - yoga or balance exercises  10 minutes of jumping per week    Environment/Stress:  Sleep in room with temperature <67 degrees  Cotton/natural fiber sheets and blankets  Fan  Meditation (try balance brandon) start with 1 min/daily  Avoid alcohol as can trigger hot flashes and be a source of empty calories     If None of the above changes are working to address your symptoms - follow up with us in the office.    Follow up 3 months       1. Postmenopausal atrophic vaginitis    2. Symptomatic menopausal or female climacteric states    3. Pelvic pain                  [1]   Allergies  Allergen Reactions    Ragweed

## 2024-12-19 ENCOUNTER — LAB ENCOUNTER (OUTPATIENT)
Dept: LAB | Age: 54
End: 2024-12-19
Attending: OBSTETRICS & GYNECOLOGY
Payer: COMMERCIAL

## 2024-12-19 DIAGNOSIS — R39.9 UTI SYMPTOMS: ICD-10-CM

## 2024-12-19 LAB
BILIRUB UR QL STRIP.AUTO: NEGATIVE
CLARITY UR REFRACT.AUTO: CLEAR
COLOR UR AUTO: COLORLESS
GLUCOSE UR STRIP.AUTO-MCNC: NORMAL MG/DL
KETONES UR STRIP.AUTO-MCNC: NEGATIVE MG/DL
LEUKOCYTE ESTERASE UR QL STRIP.AUTO: NEGATIVE
NITRITE UR QL STRIP.AUTO: NEGATIVE
PH UR STRIP.AUTO: 6.5 [PH] (ref 5–8)
PROT UR STRIP.AUTO-MCNC: NEGATIVE MG/DL
RBC UR QL AUTO: NEGATIVE
SP GR UR STRIP.AUTO: 1.01 (ref 1–1.03)
UROBILINOGEN UR STRIP.AUTO-MCNC: NORMAL MG/DL

## 2024-12-19 PROCEDURE — 87086 URINE CULTURE/COLONY COUNT: CPT

## 2024-12-19 PROCEDURE — 81003 URINALYSIS AUTO W/O SCOPE: CPT

## 2025-05-29 ENCOUNTER — HOSPITAL ENCOUNTER (OUTPATIENT)
Dept: GENERAL RADIOLOGY | Age: 55
Discharge: HOME | End: 2025-05-29

## 2025-05-29 ENCOUNTER — OFFICE VISIT (OUTPATIENT)
Facility: CLINIC | Age: 55
End: 2025-05-29
Payer: COMMERCIAL

## 2025-05-29 VITALS — WEIGHT: 130 LBS | HEIGHT: 64.5 IN | BODY MASS INDEX: 21.93 KG/M2

## 2025-05-29 DIAGNOSIS — M25.561 ACUTE PAIN OF RIGHT KNEE: ICD-10-CM

## 2025-05-29 DIAGNOSIS — M22.2X1 PATELLOFEMORAL SYNDROME OF RIGHT KNEE: Primary | ICD-10-CM

## 2025-05-29 DIAGNOSIS — M17.11 PRIMARY OSTEOARTHRITIS OF RIGHT KNEE: ICD-10-CM

## 2025-05-29 PROCEDURE — 73564 X-RAY EXAM KNEE 4 OR MORE: CPT

## 2025-05-29 PROCEDURE — 99214 OFFICE O/P EST MOD 30 MIN: CPT

## 2025-05-29 RX ORDER — MELOXICAM 15 MG/1
15 TABLET ORAL DAILY
Qty: 30 TABLET | Refills: 0 | Status: SHIPPED | OUTPATIENT
Start: 2025-05-29

## 2025-05-29 NOTE — PROGRESS NOTES
[facility] New Patient Note         The following individual(s) verbally consented to be recorded using ambient AI listening technology and understand that they can each withdraw their consent to this listening technology at any point by asking the clinician to turn off or pause the recording:    Patient name: Chitra Black      Chief Complaint   Patient presents with    Knee Pain     Treat my right knee Patient states that during her walk/run her right knee pain began Taking OTC pain meds slightly helps I ce has not done much for the pain          History of Present Illness  Chitra Black is a 55 year old female with a history of knee pain who presents with worsening right knee pain.  She was seen in the past by Clayton Levy PA-C in 2023 for mild osteoarthritis and patellofemoral syndrome of the right knee.  She reports this eventually resolved after 3 to 4 weeks of rest and activity modification and she is only experienced intermittent flareups over the past 2 years up until a week ago.    She experienced severe right knee pain that began after a walk-run session approximately one week ago on vacation.  There is no specific injury, trauma, or fall.  The pain is excruciating and throbbing, primarily located on the inner side of the knee and radiating downwards, occasionally extending up the thigh. It is exacerbated by weight-bearing activities and is present even at rest, causing significant discomfort during sleep.     She has been a runner for over twenty years but has since limited her running due to these knee problems, opting instead for daily workouts without running. She had not ran up until a week ago when she decided to give it a try again.    Her occupation as a  and  is challenging due to the pain. She notes that the current pain is worse than previous episodes and describes a sensation of instability in the knee, feeling 'wobbly' or as if the knee might 'fly out'.    She has  not taken any medication prior to the visit to ensure the pain's location and intensity could be accurately assessed. She recalls being recommended physical therapy in the past but opted to manage the condition with reduced activity and elliptical workouts, which seem to be helping up until she attempted running again.    Ibuprofen has provided some temporary relief but she continues to have pain.  She is here today for further evaluation.    Past Medical History[1]  Past Surgical History[2]  Current Medications[3]  Allergies[4]  Family History[5]  Social History     Occupational History    Not on file   Tobacco Use    Smoking status: Never     Passive exposure: Never    Smokeless tobacco: Never   Vaping Use    Vaping status: Never Used   Substance and Sexual Activity    Alcohol use: Not Currently    Drug use: Never    Sexual activity: Yes     Partners: Male        ROS:  Comprehensive system review obtained and negative except as mentioned above    Physical Exam:      Physical Exam      Ht 5' 4.5\" (1.638 m)   Wt 130 lb (59 kg)   LMP 04/01/2019 (Exact Date)   BMI 21.97 kg/m²   Constitutional: Awake, alert, no distress.  Pleasant and conversational  Psychological: Appropriate affect.  Respiratory: Unlabored breathing.  Right lower extremity:  Inspection: skin is intact without any redness or deformity. No appreciable effusion.   Palpation: Significant tenderness to palpation over the medial joint line.  No tenderness to palpation of the lateral joint line.  Tenderness to palpation over the superior and inferior pole of the patella.  Tenderness to palpation of the distal quad tendon.  No appreciable quad atrophy.  Range of motion: Knee can extend to 10 degrees short of full and flex to approximately 110 degrees.  She has pain and stiffness with active knee flexion and extension  Knee is stable to valgus and varus stress at 0 and 30 degrees. No laxity with anterior or posterior drawer.  Positive Kelsey test.  No  calf pain or tenderness.  Negative Homans' sign.  Neuromuscular: Strength is normal and sensation is intact.  Vascular: Extremities are warm and well-perfused.  Lymph: Unremarkable.    Results  Imaging: Imaging was personally viewed, independently interpreted and radiology report read.  They show: No acute fracture or dislocation.  No appreciable suprapatellar joint effusion.  There is mild medial joint space narrowing consistent with mild degenerative joint disease of the right knee.  The lateral and patellofemoral compartments are well-preserved.      Assessment & Plan  Assessment: 55-year-old female with mild primary osteoarthritis and patellofemoral syndrome of the right knee    Plan: I discussed the etiology and pathophysiology of patellofemoral dysfunction with the patient in detail. We discussed the biomechanical nature of patellofemoral dysfunction causing inflammation behind the kneecap as a result of maltracking. We discussed principles of treatment including pain control with as-needed use of anti-inflammatory medications and possible intra-articular injections if oral anti-inflammatories fail to improve pain substantially. We also discussed improving biomechanics and patellar tracking through strengthening the quadriceps musculature with physical therapy or physician-directed exercises. We discussed that improving the maltracking may take up to 1 year of diligent exercises.  As over-the-counter ibuprofen as open been providing temporary relief, she is interested in trying a prescription oral anti-inflammatory which would be reasonable.  Meloxicam was sent to the patient's pharmacy and advised her to take this for 2 weeks and save the other half for as needed use only.  Do not take any other over-the-counter or prescription NSAIDs while taking the meloxicam.  She may alternate Tylenol as needed help with the pain.  At this point, she is not interested in participating in formal physical therapy as she  works out at the gym 6 to 7 days a week and performs dedicated stretching and strengthening exercises.  Advised to stop running as this is an aggravating factor for her condition and recommend focusing on dedicated stretching and strengthening exercises to condition the knee.  She will follow-up with me in 4 weeks to assess how Meloxicam, RICE method, and activity modification are working or sooner with any worsening symptoms or concerns. All questions and concerns were addressed and answered to the patient's satisfaction. They are in agreement with the treatment plan going forward.      Ehlam Martínez [facility], PA-C  Orthopedic Surgery   t: [phone]  f: [phone]           This document was partially prepared using Dragon Medical voice recognition software.  Although every attempt is made to correct errors during dictation, discrepancies may still exist. Please contact me with any questions or clarifications.         [1]   Past Medical History:   Amenorrhea    Asthma (HCC)    Decorative tattoo    Dyspareunia    Dysplasia of cervix    cryo    Extrinsic asthma, unspecified    History of Clostridium difficile   [2]   Past Surgical History:  Procedure Laterality Date    Colposcopy, cervix w/upper adjacent vagina; w/biopsy(s), cervix      found abnormal cells.  froze them. have been fine ecer since    [facility]      Other surgical history  1995    cryo for cervical dysplasia    Tonsillectomy  1978   [3]   Current Outpatient Medications   Medication Sig Dispense Refill    azelastine 0.1 % Nasal Solution  (Patient not taking: Reported on 10/31/2024)      estradiol (MINIVELLE) 0.075 MG/24HR Transdermal Patch Biweekly Place 1 patch onto the skin twice a week. 24 patch 4    progesterone 200 MG Oral Cap Take 1 capsule (200 mg total) by mouth daily. 40 capsule 1    albuterol 108 (90 Base) MCG/ACT Inhalation Aero Soln Inhale 1 puff into the lungs every 4 (four) hours as needed for Wheezing or Shortness of Breath. 3  each 1    fluticasone furoate-vilanterol (BREO ELLIPTA) 100-25 MCG/ACT Inhalation Aerosol Powder, Breath Activated Inhale 1 puff into the lungs daily. 3 each 1    estradiol (ESTRACE) 0.1 MG/GM Vaginal Cream Place 1 g vaginally every evening for 14 days, THEN 1 g twice a week. (Patient not taking: Reported on 10/31/2024) 42.5 g 3    estradiol (ESTRACE) 0.1 MG/GM Vaginal Cream Place 1 g vaginally daily. Nightly for first week, then twice weekly. (Patient not taking: Reported on 10/31/2024) 42.5 g 3    fluticasone furoate-vilanterol 100-25 MCG/INH Inhalation Aerosol Powder, Breath Activated      [4]   Allergies  Allergen Reactions    Ragweed    [5]   Family History  Problem Relation Age of Onset    Cancer Mother         stomach cancer    Cancer Father         parkinsons

## 2025-05-29 NOTE — PROGRESS NOTES
The following individual(s) verbally consented to be recorded using ambient AI listening technology and understand that they can each withdraw their consent to this listening technology at any point by asking the clinician to turn off or pause the recording:    Patient name: Chitra Black  Additional names:

## 2025-06-24 ENCOUNTER — OFFICE VISIT (OUTPATIENT)
Facility: CLINIC | Age: 55
End: 2025-06-24
Payer: COMMERCIAL

## 2025-06-24 VITALS — HEIGHT: 64.5 IN | WEIGHT: 130 LBS | BODY MASS INDEX: 21.93 KG/M2

## 2025-06-24 DIAGNOSIS — M22.2X1 PATELLOFEMORAL SYNDROME OF RIGHT KNEE: ICD-10-CM

## 2025-06-24 DIAGNOSIS — M17.11 PRIMARY OSTEOARTHRITIS OF RIGHT KNEE: Primary | ICD-10-CM

## 2025-06-24 PROCEDURE — 99214 OFFICE O/P EST MOD 30 MIN: CPT

## 2025-06-24 NOTE — PROGRESS NOTES
[facility] Progress Note         The following individual(s) verbally consented to be recorded using ambient AI listening technology and understand that they can each withdraw their consent to this listening technology at any point by asking the clinician to turn off or pause the recording:    Patient name: Chitra Black      History of Present Illness  Chitra Black is a 55 year old female with a history of mild osteoarthritis and patellofemoral syndrome presents to follow-up on right knee pain.    She has experienced significant improvement in her right knee pain since her last visit, with the previous excruciating pain now resolved. However, she continues to experience occasional buckling and an aching pain in the knee. The buckling occurs intermittently and is less severe than during the previous flare-up. Although the pain is not as intense as before, it still affects her ability to run and workout to the extent she was before, which she misses greatly.  She does still notice some lingering pain after going up and down stairs or walking on uneven surfaces as a  for prolonged periods of time.  After a lot of walking, she notes stiffness in her knee joint.    She took the meloxicam for about a week but started noticing an upset stomach so discontinued this.  She has been icing the knee intermittently.  She has tried steroid injections in the past on her other knee, which did not provide significant relief.  She has been performing dedicated stretching and strengthening exercises daily at the gym which seem to be helping condition her knee.    She is concerned about the impact of her knee issues on her active lifestyle, particularly her ability to run and perform her duties as a  and .  She is interested in discussing possible injections and is here today for further evaluation      Physical Exam  Constitutional: Awake, alert, no distress.  Pleasant and  conversational  Psychological: Appropriate affect.  Respiratory: Unlabored breathing.  Right lower extremity:  Inspection: skin is intact without any redness or deformity. No appreciable effusion.   Palpation: Moderate tenderness to palpation over the medial joint line.  No tenderness to palpation of the lateral joint line.  Tenderness to palpation over the superior and inferior pole of the patella.  No tenderness to palpation over the distal quad tendon.  No appreciable quad atrophy.  Range of motion: Knee can extend to 10 degrees short of full and flex to approximately 120 degrees.  She has pain and stiffness with active knee flexion and extension  Knee is stable to valgus and varus stress at 0 and 30 degrees. No laxity with anterior or posterior drawer.  Negative Kelsey test.  No calf pain or tenderness.  Negative Homans' sign.   Neuromuscular: Strength is normal and sensation is intact.  Vascular: Extremities are warm and well-perfused.  Lymph: Unremarkable.    Results      Assessment & Plan  Assessment: 55-year-old female with mild primary osteoarthritis and patellofemoral syndrome of the right knee    Plan: I discussed the etiology, natural history, and management options for symptomatic knee osteoarthritis.  I discussed nonsurgical and surgical treatments, with nonsurgical treatments to include anti-inflammatory medications, injections, activity modification, weight loss, low impact exercise and possible therapy.  Surgery would be with knee replacement and is an elective operation reserved for when nonsurgical treatments no longer alleviate symptoms sufficiently.  I also reemphasized that improving biomechanics and patellar tracking through strengthening the quadricep musculature with physical therapy or physician directed exercise to improve patellar maltracking can take up to 1 year of diligent exercises.  As meloxicam caused her to have an upset stomach, advised her to discontinue this medication.   Instead, she inquired about the possibility of trying viscosupplementation injections as prior steroid injections have not provided any relief for her.  Explained that as her degree of osteoarthritis is minimal on radiograph, she may be a good candidate to try gel injections which she would like to proceed with.  Authorization for Durolane gel injection was sent at today's visit and advised that our office will call her once this is approved and she can schedule an injection appointment with me at her earliest convenience. At this point, she is not interested in participating in formal physical therapy as she works out at the gym 6 to 7 days a week and performs dedicated stretching and strengthening exercises and does feel that this has provided some benefit to her.  She has also stopped running at this time as this is an aggravating factor for her patellofemoral condition and recommended focusing on dedicated stretching and strengthening exercises to condition the knee for now.  She will follow-up with me for the Durolane injection at her earliest convenience or sooner with any worsening symptoms or concerns. All questions and concerns were addressed and answered to the patient's satisfaction. They are in agreement with the treatment plan going forward.       Elham Martínez [facility], PA-C  Orthopedic Surgery   [address] [place] [id number]   t: [phone]  f: [phone]         This document was partially prepared using Dragon Medical voice recognition software.  Although every attempt is made to correct errors during dictation, discrepancies may still exist. Please contact me with any questions or clarifications.

## 2025-06-30 ENCOUNTER — TELEPHONE (OUTPATIENT)
Dept: ORTHOPEDICS CLINIC | Facility: CLINIC | Age: 55
End: 2025-06-30

## 2025-07-19 NOTE — TELEPHONE ENCOUNTER
Patient authorized visco to be scheduled:    DOS:7/28/25  PROVIDER:Elham  MEDICATION: monovisc  OFFICE LOCATION:[facility]  PULLED:yes  LABELED:yes  PLACED:yes

## 2025-08-07 RX ORDER — ALBUTEROL SULFATE 90 UG/1
1 INHALANT RESPIRATORY (INHALATION) EVERY 4 HOURS PRN
Qty: 3 EACH | Refills: 0 | Status: CANCELLED | OUTPATIENT
Start: 2025-08-07

## (undated) NOTE — ED AVS SNAPSHOT
[facility] in [address] [place] [address] Drive,[address]    [address] [place] [address]    Phone:  [phone]    Fax:  [phone]           Sriram Grimm   MRN: [id number]    Department:  [facility]   Date of Visit:  4/21/2017 Take 40 mg for 2 days, 30 mg for 2 days, 20 mg for 2 days, 10 mg for 2 days with breakfast.  Start on 4/21/17. What changed: This medication is already on your medication list.  Do NOT take both doses of the new and old medication.   ONLY take the dose p may not be covered by your plan. Please contact your insurance company to determine coverage for follow-up care and referrals. [facility]  [address] [facility], [address] [place] [address]  [phone] [address] prescription right away and begin taking the medication(s) as directed. If the Immediate Care Provider has read X-rays, these will be re-interpreted by a radiologist.  If there is a significant change in your reading, you will be contacted.  Please make Medicaid plans. To get signed up and covered, please call [phone] and ask to get set up for an insurance coverage that is in-network with [facility].         Imaging Results         XR CHEST PA + LAT CHEST (CPT=71020) (Final result) Resu

## (undated) NOTE — ED AVS SNAPSHOT
[facility] in [address] [place] [address] Drive,[address]    [address] [place] [address]    Phone:  [phone]    Fax:  [phone]           Charlee Donny   MRN: [id number]    Department:  [facility] END   Date of Visit:  4/14/2017 take both doses of the new and old medication. ONLY take the dose prescribed today.             Where to Get Your Medications      These medications were sent to [address], [facility] [phone] [address] [place] [address] 1   [phone]       To Check ER Wait Times:  TEXT 'Thanh Moran' to [address]      Click [email or web address]      Or call [phone]    If you have any problems with your follow-up, please phone number before you leave. After you leave, you should follow the attached instructions. I have read and understand the instructions given to me by my caregivers.         [facility]        Pharmacy Address Phone Number   [phone] [employer] will allow you to access patient instructions from your recent visit,  view other health information, and more. To sign up or find more information, go to [email or web address] and click on the Sign Up Now link in the [employer] box.      Enter